# Patient Record
Sex: MALE | Race: WHITE | Employment: UNEMPLOYED | URBAN - METROPOLITAN AREA
[De-identification: names, ages, dates, MRNs, and addresses within clinical notes are randomized per-mention and may not be internally consistent; named-entity substitution may affect disease eponyms.]

---

## 2018-08-26 ENCOUNTER — HOSPITAL ENCOUNTER (EMERGENCY)
Age: 33
Discharge: HOME | End: 2018-08-26
Attending: EMERGENCY MEDICINE
Payer: COMMERCIAL

## 2018-08-26 ENCOUNTER — APPOINTMENT (OUTPATIENT)
Dept: GENERAL RADIOLOGY | Age: 33
End: 2018-08-26
Payer: COMMERCIAL

## 2018-08-26 VITALS
RESPIRATION RATE: 18 BRPM | SYSTOLIC BLOOD PRESSURE: 133 MMHG | HEIGHT: 70 IN | BODY MASS INDEX: 38.65 KG/M2 | WEIGHT: 270 LBS | OXYGEN SATURATION: 95 % | DIASTOLIC BLOOD PRESSURE: 66 MMHG | HEART RATE: 89 BPM | TEMPERATURE: 98.2 F

## 2018-08-26 DIAGNOSIS — S61.412D LACERATION OF LEFT HAND WITHOUT FOREIGN BODY, SUBSEQUENT ENCOUNTER: Primary | ICD-10-CM

## 2018-08-26 PROCEDURE — 73130 X-RAY EXAM OF HAND: CPT

## 2018-08-26 PROCEDURE — 90715 TDAP VACCINE 7 YRS/> IM: CPT | Performed by: EMERGENCY MEDICINE

## 2018-08-26 PROCEDURE — 99282 EMERGENCY DEPT VISIT SF MDM: CPT

## 2018-08-26 PROCEDURE — 29125 APPL SHORT ARM SPLINT STATIC: CPT

## 2018-08-26 PROCEDURE — 6370000000 HC RX 637 (ALT 250 FOR IP): Performed by: EMERGENCY MEDICINE

## 2018-08-26 PROCEDURE — 6360000002 HC RX W HCPCS: Performed by: EMERGENCY MEDICINE

## 2018-08-26 PROCEDURE — 90471 IMMUNIZATION ADMIN: CPT | Performed by: EMERGENCY MEDICINE

## 2018-08-26 RX ORDER — CEPHALEXIN 500 MG/1
500 CAPSULE ORAL 4 TIMES DAILY
Qty: 28 CAPSULE | Refills: 0 | Status: SHIPPED | OUTPATIENT
Start: 2018-08-26 | End: 2018-09-02

## 2018-08-26 RX ORDER — METFORMIN HYDROCHLORIDE 500 MG/1
500 TABLET, EXTENDED RELEASE ORAL 2 TIMES DAILY
COMMUNITY
End: 2019-04-26 | Stop reason: SDUPTHER

## 2018-08-26 RX ORDER — HYDROCODONE BITARTRATE AND ACETAMINOPHEN 5; 325 MG/1; MG/1
1 TABLET ORAL EVERY 6 HOURS PRN
Qty: 10 TABLET | Refills: 0 | Status: SHIPPED | OUTPATIENT
Start: 2018-08-26 | End: 2018-08-29

## 2018-08-26 RX ORDER — IBUPROFEN 600 MG/1
600 TABLET ORAL EVERY 6 HOURS PRN
Qty: 30 TABLET | Refills: 0 | Status: SHIPPED | OUTPATIENT
Start: 2018-08-26 | End: 2018-10-21 | Stop reason: ALTCHOICE

## 2018-08-26 RX ORDER — GINSENG 100 MG
CAPSULE ORAL 3 TIMES DAILY
Status: DISCONTINUED | OUTPATIENT
Start: 2018-08-26 | End: 2018-08-26 | Stop reason: HOSPADM

## 2018-08-26 RX ORDER — CEPHALEXIN 500 MG/1
500 CAPSULE ORAL ONCE
Status: COMPLETED | OUTPATIENT
Start: 2018-08-26 | End: 2018-08-26

## 2018-08-26 RX ORDER — GLIPIZIDE 5 MG/1
5 TABLET ORAL
COMMUNITY
End: 2019-04-08 | Stop reason: SDUPTHER

## 2018-08-26 RX ORDER — IBUPROFEN 400 MG/1
800 TABLET ORAL ONCE
Status: COMPLETED | OUTPATIENT
Start: 2018-08-26 | End: 2018-08-26

## 2018-08-26 RX ADMIN — IBUPROFEN 800 MG: 400 TABLET ORAL at 18:15

## 2018-08-26 RX ADMIN — TETANUS TOXOID, REDUCED DIPHTHERIA TOXOID AND ACELLULAR PERTUSSIS VACCINE, ADSORBED 0.5 ML: 5; 2.5; 8; 8; 2.5 SUSPENSION INTRAMUSCULAR at 18:14

## 2018-08-26 RX ADMIN — CEPHALEXIN 500 MG: 500 CAPSULE ORAL at 18:15

## 2018-08-26 ASSESSMENT — ENCOUNTER SYMPTOMS
CHOKING: 0
ABDOMINAL PAIN: 0
FACIAL SWELLING: 0
EYE REDNESS: 0
CHEST TIGHTNESS: 0
SHORTNESS OF BREATH: 0
BACK PAIN: 0
CONSTIPATION: 0
SINUS PRESSURE: 0
BLOOD IN STOOL: 0
SORE THROAT: 0
EYE DISCHARGE: 0
DIARRHEA: 0
WHEEZING: 0
VOMITING: 0
VOICE CHANGE: 0
STRIDOR: 0
TROUBLE SWALLOWING: 0
COUGH: 0
EYE PAIN: 0

## 2018-08-26 ASSESSMENT — PAIN SCALES - GENERAL
PAINLEVEL_OUTOF10: 8
PAINLEVEL_OUTOF10: 4
PAINLEVEL_OUTOF10: 8

## 2018-08-26 ASSESSMENT — PAIN DESCRIPTION - LOCATION
LOCATION: HAND
LOCATION: HAND

## 2018-08-26 ASSESSMENT — PAIN DESCRIPTION - ONSET: ONSET: SUDDEN

## 2018-08-26 ASSESSMENT — PAIN DESCRIPTION - PROGRESSION
CLINICAL_PROGRESSION: GRADUALLY IMPROVING
CLINICAL_PROGRESSION: NOT CHANGED

## 2018-08-26 ASSESSMENT — PAIN DESCRIPTION - PAIN TYPE
TYPE: ACUTE PAIN
TYPE: ACUTE PAIN

## 2018-08-26 ASSESSMENT — PAIN DESCRIPTION - DESCRIPTORS: DESCRIPTORS: ACHING

## 2018-08-26 ASSESSMENT — PAIN DESCRIPTION - ORIENTATION
ORIENTATION: LEFT
ORIENTATION: LEFT

## 2018-08-26 NOTE — ED PROVIDER NOTES
[facility] ED  eMERGENCY dEPARTMENT eNCOUnter      Pt Name: Rick Bassett  MRN: [id number]  [place] 1985  Date of evaluation: 8/26/2018  Provider: Raymundo Zavala MD    [address] [place] [address]       Chief Complaint   Patient presents with    Laceration     laceration to left hand from an axe while cutting wood, injured last night         HISTORY OF PRESENT ILLNESS   (Location/Symptom, Timing/Onset, Context/Setting, Quality, Duration, Modifying Factors, Severity)  Note limiting factors. Rick Bassett is a 26 y.o. male who presents to the emergency department Patient accidentally cut his left hand on an ax while he was cutting wood it happened last night as per patient he was busy taking care of his kids so he did not seek attention and he came at this time more than 18 hours after miss some clotted blood in the left hand wound which happened yesterday patient has no numbness tingling no other injuries last tetanus immunization not sure    HPI    Nursing Notes were reviewed. REVIEW OF SYSTEMS    (2-9 systems for level 4, 10 or more for level 5)     Review of Systems   Constitutional: Negative. Negative for activity change and fever. HENT: Negative for congestion, drooling, facial swelling, mouth sores, nosebleeds, sinus pressure, sore throat, trouble swallowing and voice change. Eyes: Negative for pain, discharge, redness and visual disturbance. Respiratory: Negative for cough, choking, chest tightness, shortness of breath, wheezing and stridor. Cardiovascular: Negative for chest pain, palpitations and leg swelling. Gastrointestinal: Negative for abdominal pain, blood in stool, constipation, diarrhea and vomiting. Endocrine: Negative for cold intolerance, polyphagia and polyuria. Genitourinary: Negative for dysuria, flank pain, frequency, genital sores and urgency. Musculoskeletal: Negative for back pain, joint swelling, neck pain and neck stiffness. Skin: Positive for wound.  Negative for pallor and rash. Neurological: Negative for tremors, seizures, syncope, weakness, numbness and headaches. Hematological: Negative for adenopathy. Does not bruise/bleed easily. Psychiatric/Behavioral: Negative for agitation, behavioral problems, hallucinations and sleep disturbance. The patient is not hyperactive. All other systems reviewed and are negative. Except as noted above the remainder of the review of systems was reviewed and negative. PAST MEDICAL HISTORY     Past Medical History:   Diagnosis Date    Diabetes mellitus ([facility] [address]     Hypertension          SURGICAL HISTORY       Past Surgical History:   Procedure Laterality Date    TONSILLECTOMY AND ADENOIDECTOMY           CURRENT MEDICATIONS       Previous Medications    GLIPIZIDE (GLUCOTROL) 5 MG TABLET    Take 5 mg by mouth 2 times daily (before meals)    METFORMIN (GLUCOPHAGE-XR) 500 MG EXTENDED RELEASE TABLET    Take 500 mg by mouth 2 times daily    METOPROLOL (TOPROL XL) 50 MG XL TABLET    Take 50 mg by mouth 2 times daily. ALLERGIES     Clindamycin/lincomycin and Pcn [penicillins]    FAMILY HISTORY     History reviewed. No pertinent family history.        SOCIAL HISTORY       Social History     Social History    Marital status: Single     Spouse name: N/A    Number of children: N/A    Years of education: N/A     Social History Main Topics    Smoking status: Current Every Day Smoker     Packs/day: 1.00     Types: Cigarettes    Smokeless tobacco: Never Used      Comment: trying to quit    Alcohol use No    Drug use: No    Sexual activity: Not Asked     Other Topics Concern    None     Social History Narrative    None       SCREENINGS             PHYSICAL EXAM    (up to 7 for level 4, 8 or more for level 5)     ED Triage Vitals [08/26/18 1759]   BP Temp Temp Source Pulse Resp SpO2 Height Weight   133/66 98.2 °F (36.8 °C) Oral 89 18 95 % 5' 10\" (1.778 m) 270 lb (122.5 kg)       Physical Exam   Constitutional: He is oriented to person, place, and time. He appears well-developed. Active alert cooperative patient   HENT:   Head: Normocephalic. Eyes: Conjunctivae and EOM are normal. Right eye exhibits no discharge. Left eye exhibits no discharge. No scleral icterus. Neck: Neck supple. No JVD present. No tracheal deviation present. No thyromegaly present. Cardiovascular: Normal rate and normal heart sounds. Exam reveals no gallop. No murmur heard. Pulmonary/Chest: Breath sounds normal. No respiratory distress. He has no wheezes. Abdominal: Soft. Bowel sounds are normal. He exhibits no mass. There is no rebound. Musculoskeletal: Normal range of motion. He exhibits edema and tenderness. Attention given to the left hand patient has a [address] by 0.2 cm laceration to the left hand dorsum of the left thenar eminence patient able to touch thumb to the little finger patient has good abduction and adduction clotted blood seen wound explored but there is no foreign body being ordered I was 18 hours cart Saturday not to stitch at this time, patient understood very well   Lymphadenopathy:     He has no cervical adenopathy. Neurological: He is alert and oriented to person, place, and time. No cranial nerve deficit. He exhibits normal muscle tone. Skin: Skin is warm. No rash noted. No erythema.    Psychiatric: His behavior is normal. Thought content normal.       DIAGNOSTIC RESULTS     EKG: All EKG's are interpreted by the Emergency Department Physician who either signs or Co-signs this chart in the absence of a cardiologist.        RADIOLOGY:   Non-plain film images such as CT, Ultrasound and MRI are read by the radiologist. Plain radiographic images are visualized and preliminarily interpreted by the emergency physician with the below findings:        Interpretation per the Radiologist below, if available at the time of this note:    XR HAND LEFT (MIN 3 VIEWS)    (Results Pending)         ED BEDSIDE ULTRASOUND:   Performed by ED

## 2018-08-26 NOTE — ED NOTES
Wound care and dressing applied to left hand wound, velcro thumb spica applied per order, Pt MSP intact, Pt bell well     Tanika Flaherty RN  08/26/18 1148

## 2018-09-19 ENCOUNTER — APPOINTMENT (OUTPATIENT)
Dept: GENERAL RADIOLOGY | Age: 33
End: 2018-09-19
Payer: COMMERCIAL

## 2018-09-19 ENCOUNTER — HOSPITAL ENCOUNTER (EMERGENCY)
Age: 33
Discharge: HOME | End: 2018-09-19
Attending: EMERGENCY MEDICINE
Payer: COMMERCIAL

## 2018-09-19 VITALS
HEIGHT: 70 IN | WEIGHT: 260 LBS | OXYGEN SATURATION: 98 % | SYSTOLIC BLOOD PRESSURE: 147 MMHG | BODY MASS INDEX: 37.22 KG/M2 | RESPIRATION RATE: 16 BRPM | HEART RATE: 67 BPM | DIASTOLIC BLOOD PRESSURE: 77 MMHG | TEMPERATURE: 98.5 F

## 2018-09-19 DIAGNOSIS — T14.8XXA CRUSH INJURY: ICD-10-CM

## 2018-09-19 DIAGNOSIS — S60.012A CONTUSION OF LEFT THUMB WITHOUT DAMAGE TO NAIL, INITIAL ENCOUNTER: ICD-10-CM

## 2018-09-19 DIAGNOSIS — M79.642 HAND PAIN, LEFT: Primary | ICD-10-CM

## 2018-09-19 PROCEDURE — 99283 EMERGENCY DEPT VISIT LOW MDM: CPT

## 2018-09-19 PROCEDURE — 73130 X-RAY EXAM OF HAND: CPT

## 2018-09-19 RX ORDER — OXYCODONE HYDROCHLORIDE AND ACETAMINOPHEN 5; 325 MG/1; MG/1
1 TABLET ORAL EVERY 6 HOURS PRN
Qty: 12 TABLET | Refills: 0 | Status: SHIPPED | OUTPATIENT
Start: 2018-09-19 | End: 2018-09-22

## 2018-09-19 ASSESSMENT — PAIN DESCRIPTION - ORIENTATION
ORIENTATION: LEFT
ORIENTATION: LEFT

## 2018-09-19 ASSESSMENT — ENCOUNTER SYMPTOMS
TROUBLE SWALLOWING: 0
DIARRHEA: 0
CONSTIPATION: 0
VOICE CHANGE: 0
EYE PAIN: 0
EYE DISCHARGE: 0
SORE THROAT: 0
EYE REDNESS: 0
FACIAL SWELLING: 0
WHEEZING: 0
CHEST TIGHTNESS: 0
SINUS PRESSURE: 0
STRIDOR: 0
COUGH: 0
BACK PAIN: 0
ABDOMINAL PAIN: 0
CHOKING: 0
VOMITING: 0
SHORTNESS OF BREATH: 0
BLOOD IN STOOL: 0

## 2018-09-19 ASSESSMENT — PAIN DESCRIPTION - DESCRIPTORS: DESCRIPTORS: SHARP;THROBBING

## 2018-09-19 ASSESSMENT — PAIN DESCRIPTION - LOCATION
LOCATION: HAND
LOCATION: HAND

## 2018-09-19 ASSESSMENT — PAIN DESCRIPTION - PAIN TYPE: TYPE: ACUTE PAIN

## 2018-09-19 ASSESSMENT — PAIN SCALES - GENERAL
PAINLEVEL_OUTOF10: 8
PAINLEVEL_OUTOF10: 7

## 2018-09-19 NOTE — ED TRIAGE NOTES
Patient arrives with left hand pain. States approx one month ago he injured it and was seen at that time. Today states he re injured by closing in door. Throbbing pain with swelling, tenderness and painful movement. A/0 x3, ambulatory, resps even and unlabored on room air. Call bell in reach.

## 2018-09-20 NOTE — ED PROVIDER NOTES
[facility] ED  eMERGENCY dEPARTMENT eNCOUnter      Pt Name: Nafisa Soto  MRN: [id number]  [place] 1985  Date of evaluation: 9/19/2018  Provider: John Araujo MD    [address] [place] [address]       Chief Complaint   Patient presents with    Hand Pain     Left         HISTORY OF PRESENT ILLNESS   (Location/Symptom, Timing/Onset, Context/Setting, Quality, Duration, Modifying Factors, Severity)  Note limiting factors. Nafisa Soto is a 26 y.o. male who presents to the emergency department Patient with history of hypertension ADHD known to me from last visit when she he had a cut to the right hand and came 1 day after injury and which is in the process of healing at this time finish antibiotics patient only injured his same place a right hand below the thumb with a crush injury to the car door as per patient no new laceration but soft tissue swelling and pain     HPI    Nursing Notes were reviewed. REVIEW OF SYSTEMS    (2-9 systems for level 4, 10 or more for level 5)     Review of Systems   Constitutional: Negative. Negative for activity change and fever. HENT: Negative for congestion, drooling, facial swelling, mouth sores, nosebleeds, sinus pressure, sore throat, trouble swallowing and voice change. Eyes: Negative for pain, discharge, redness and visual disturbance. Respiratory: Negative for cough, choking, chest tightness, shortness of breath, wheezing and stridor. Cardiovascular: Negative for chest pain, palpitations and leg swelling. Gastrointestinal: Negative for abdominal pain, blood in stool, constipation, diarrhea and vomiting. Endocrine: Negative for cold intolerance, polyphagia and polyuria. Genitourinary: Negative for dysuria, flank pain, frequency, genital sores and urgency. Musculoskeletal: Positive for arthralgias and joint swelling. Negative for back pain, neck pain and neck stiffness. Skin: Negative for pallor and rash.    Neurological: Negative for tremors, seizures, syncope, weakness, numbness and headaches. Hematological: Negative for adenopathy. Does not bruise/bleed easily. Psychiatric/Behavioral: Negative for agitation, behavioral problems, hallucinations and sleep disturbance. The patient is not hyperactive. All other systems reviewed and are negative. Except as noted above the remainder of the review of systems was reviewed and negative. PAST MEDICAL HISTORY     Past Medical History:   Diagnosis Date    Diabetes mellitus ([facility] [address]     Hypertension          SURGICAL HISTORY       Past Surgical History:   Procedure Laterality Date    HERNIA REPAIR      MOUTH SURGERY      TONSILLECTOMY AND ADENOIDECTOMY           CURRENT MEDICATIONS       Discharge Medication List as of 9/19/2018  7:03 PM      CONTINUE these medications which have NOT CHANGED    Details   glipiZIDE (GLUCOTROL) 5 MG tablet Take 5 mg by mouth 2 times daily (before meals)Historical Med      metFORMIN (GLUCOPHAGE-XR) 500 MG extended release tablet Take 500 mg by mouth 2 times dailyHistorical Med      ibuprofen (ADVIL;MOTRIN) 600 MG tablet Take 1 tablet by mouth every 6 hours as needed for Pain, Disp-30 tablet, R-0Print      metoprolol (TOPROL XL) 50 MG XL tablet Take 50 mg by mouth 2 times daily. ALLERGIES     Clindamycin/lincomycin; Pcn [penicillins]; and Tramadol    FAMILY HISTORY     History reviewed. No pertinent family history.        SOCIAL HISTORY       Social History     Social History    Marital status: Single     Spouse name: N/A    Number of children: N/A    Years of education: N/A     Social History Main Topics    Smoking status: Current Every Day Smoker     Packs/day: 1.00     Types: Cigarettes    Smokeless tobacco: Never Used      Comment: trying to quit    Alcohol use No    Drug use: No    Sexual activity: Not Asked     Other Topics Concern    None     Social History Narrative    None       SCREENINGS             PHYSICAL EXAM    (up to 7 for level 4, 8 or

## 2018-09-29 ENCOUNTER — HOSPITAL ENCOUNTER (EMERGENCY)
Age: 33
Discharge: HOME | End: 2018-09-29
Attending: EMERGENCY MEDICINE
Payer: COMMERCIAL

## 2018-09-29 VITALS
HEART RATE: 79 BPM | HEIGHT: 70 IN | TEMPERATURE: 98.2 F | OXYGEN SATURATION: 99 % | DIASTOLIC BLOOD PRESSURE: 84 MMHG | RESPIRATION RATE: 18 BRPM | WEIGHT: 259 LBS | SYSTOLIC BLOOD PRESSURE: 162 MMHG | BODY MASS INDEX: 37.08 KG/M2

## 2018-09-29 DIAGNOSIS — M79.642 LEFT HAND PAIN: Primary | ICD-10-CM

## 2018-09-29 DIAGNOSIS — S60.222D CONTUSION OF LEFT HAND, SUBSEQUENT ENCOUNTER: ICD-10-CM

## 2018-09-29 PROCEDURE — 96372 THER/PROPH/DIAG INJ SC/IM: CPT

## 2018-09-29 PROCEDURE — 6360000002 HC RX W HCPCS: Performed by: EMERGENCY MEDICINE

## 2018-09-29 PROCEDURE — 29125 APPL SHORT ARM SPLINT STATIC: CPT

## 2018-09-29 PROCEDURE — 99283 EMERGENCY DEPT VISIT LOW MDM: CPT

## 2018-09-29 RX ORDER — KETOROLAC TROMETHAMINE 30 MG/ML
60 INJECTION, SOLUTION INTRAMUSCULAR; INTRAVENOUS ONCE
Status: COMPLETED | OUTPATIENT
Start: 2018-09-29 | End: 2018-09-29

## 2018-09-29 RX ORDER — KETOROLAC TROMETHAMINE 10 MG/1
10 TABLET, FILM COATED ORAL EVERY 6 HOURS PRN
Qty: 20 TABLET | Refills: 0 | Status: SHIPPED | OUTPATIENT
Start: 2018-09-29 | End: 2018-11-17

## 2018-09-29 RX ORDER — OXYCODONE HYDROCHLORIDE AND ACETAMINOPHEN 5; 325 MG/1; MG/1
1 TABLET ORAL EVERY 6 HOURS PRN
Qty: 10 TABLET | Refills: 0 | Status: SHIPPED | OUTPATIENT
Start: 2018-09-29 | End: 2018-10-06

## 2018-09-29 RX ORDER — ORPHENADRINE CITRATE 30 MG/ML
60 INJECTION INTRAMUSCULAR; INTRAVENOUS ONCE
Status: COMPLETED | OUTPATIENT
Start: 2018-09-29 | End: 2018-09-29

## 2018-09-29 RX ADMIN — ORPHENADRINE CITRATE 60 MG: 30 INJECTION INTRAMUSCULAR; INTRAVENOUS at 18:34

## 2018-09-29 RX ADMIN — KETOROLAC TROMETHAMINE 60 MG: 30 INJECTION, SOLUTION INTRAMUSCULAR at 18:33

## 2018-09-29 ASSESSMENT — PAIN DESCRIPTION - LOCATION: LOCATION: HAND

## 2018-09-29 ASSESSMENT — ENCOUNTER SYMPTOMS
ABDOMINAL PAIN: 0
ABDOMINAL DISTENTION: 0
DIARRHEA: 0
SINUS PRESSURE: 0
COLOR CHANGE: 0
CONSTIPATION: 0
EYE PAIN: 0
WHEEZING: 0
COUGH: 0
VOMITING: 0
APNEA: 0
NAUSEA: 0
RHINORRHEA: 0
SHORTNESS OF BREATH: 0
SORE THROAT: 0
BACK PAIN: 0
PHOTOPHOBIA: 0

## 2018-09-29 ASSESSMENT — PAIN SCALES - GENERAL: PAINLEVEL_OUTOF10: 5

## 2018-09-29 ASSESSMENT — PAIN DESCRIPTION - ORIENTATION: ORIENTATION: LEFT

## 2018-09-29 NOTE — ED PROVIDER NOTES
for dizziness, tremors, syncope, weakness, light-headedness and headaches. Psychiatric/Behavioral: Negative for agitation, confusion and hallucinations. All other systems reviewed and are negative. Except as noted above the remainder of the review of systems was reviewed and negative. PAST MEDICAL HISTORY     Past Medical History:   Diagnosis Date    Diabetes mellitus ([facility] [address]     Hypertension          SURGICAL HISTORY       Past Surgical History:   Procedure Laterality Date    HERNIA REPAIR      MOUTH SURGERY      TONSILLECTOMY AND ADENOIDECTOMY           CURRENT MEDICATIONS       Discharge Medication List as of 9/29/2018  6:39 PM      CONTINUE these medications which have NOT CHANGED    Details   glipiZIDE (GLUCOTROL) 5 MG tablet Take 5 mg by mouth 2 times daily (before meals)Historical Med      metFORMIN (GLUCOPHAGE-XR) 500 MG extended release tablet Take 500 mg by mouth 2 times dailyHistorical Med      metoprolol (TOPROL XL) 50 MG XL tablet Take 50 mg by mouth 2 times daily. ibuprofen (ADVIL;MOTRIN) 600 MG tablet Take 1 tablet by mouth every 6 hours as needed for Pain, Disp-30 tablet, R-0Print             ALLERGIES     Clindamycin/lincomycin; Pcn [penicillins]; and Tramadol    FAMILY HISTORY     History reviewed. No pertinent family history.        SOCIAL HISTORY       Social History     Social History    Marital status: Single     Spouse name: N/A    Number of children: N/A    Years of education: N/A     Social History Main Topics    Smoking status: Current Every Day Smoker     Packs/day: 1.00     Types: Cigarettes    Smokeless tobacco: Never Used      Comment: trying to quit    Alcohol use No    Drug use: No    Sexual activity: Not Asked     Other Topics Concern    None     Social History Narrative    None       SCREENINGS             PHYSICAL EXAM    (up to 7 for level 4, 8 or more for level 5)     ED Triage Vitals   BP Temp Temp Source Pulse Resp SpO2 Height Weight   09/29/18

## 2018-10-13 ENCOUNTER — HOSPITAL ENCOUNTER (EMERGENCY)
Age: 33
Discharge: HOME | End: 2018-10-13
Attending: EMERGENCY MEDICINE
Payer: COMMERCIAL

## 2018-10-13 VITALS
DIASTOLIC BLOOD PRESSURE: 79 MMHG | TEMPERATURE: 98.4 F | WEIGHT: 259 LBS | SYSTOLIC BLOOD PRESSURE: 145 MMHG | OXYGEN SATURATION: 98 % | BODY MASS INDEX: 37.08 KG/M2 | HEIGHT: 70 IN | HEART RATE: 76 BPM | RESPIRATION RATE: 18 BRPM

## 2018-10-13 DIAGNOSIS — S69.91XS THUMB INJURY, RIGHT, SEQUELA: ICD-10-CM

## 2018-10-13 DIAGNOSIS — M79.641 RIGHT HAND PAIN: Primary | ICD-10-CM

## 2018-10-13 PROCEDURE — 99283 EMERGENCY DEPT VISIT LOW MDM: CPT

## 2018-10-13 RX ORDER — HYDROCODONE BITARTRATE AND ACETAMINOPHEN 5; 325 MG/1; MG/1
1 TABLET ORAL EVERY 6 HOURS PRN
Qty: 12 TABLET | Refills: 0 | Status: SHIPPED | OUTPATIENT
Start: 2018-10-13 | End: 2018-10-18

## 2018-10-13 ASSESSMENT — ENCOUNTER SYMPTOMS
FACIAL SWELLING: 0
SINUS PRESSURE: 0
SHORTNESS OF BREATH: 0
ABDOMINAL PAIN: 0
VOMITING: 0
DIARRHEA: 0
CHEST TIGHTNESS: 0
STRIDOR: 0
EYE REDNESS: 0
COUGH: 0
BLOOD IN STOOL: 0
TROUBLE SWALLOWING: 0
VOICE CHANGE: 0
CONSTIPATION: 0
CHOKING: 0
SORE THROAT: 0
WHEEZING: 0
EYE DISCHARGE: 0
BACK PAIN: 0
EYE PAIN: 0

## 2018-10-13 ASSESSMENT — PAIN DESCRIPTION - LOCATION: LOCATION: HAND

## 2018-10-13 ASSESSMENT — PAIN SCALES - GENERAL: PAINLEVEL_OUTOF10: 7

## 2018-10-13 ASSESSMENT — PAIN DESCRIPTION - ORIENTATION: ORIENTATION: LEFT

## 2018-10-13 ASSESSMENT — PAIN DESCRIPTION - PAIN TYPE: TYPE: ACUTE PAIN

## 2018-10-13 ASSESSMENT — PAIN DESCRIPTION - DESCRIPTORS: DESCRIPTORS: ACHING

## 2018-10-13 NOTE — ED PROVIDER NOTES
neck pain and neck stiffness. Skin: Negative for pallor and rash. Neurological: Negative for tremors, seizures, syncope, weakness, numbness and headaches. Hematological: Negative for adenopathy. Does not bruise/bleed easily. Psychiatric/Behavioral: Negative for agitation, behavioral problems, hallucinations and sleep disturbance. The patient is not hyperactive. All other systems reviewed and are negative. Except as noted above the remainder of the review of systems was reviewed and negative. PAST MEDICAL HISTORY     Past Medical History:   Diagnosis Date    Diabetes mellitus ([facility] [address]     Hypertension          SURGICALHISTORY       Past Surgical History:   Procedure Laterality Date    HERNIA REPAIR      MOUTH SURGERY      TONSILLECTOMY AND ADENOIDECTOMY           CURRENT MEDICATIONS       Discharge Medication List as of 10/13/2018  4:51 PM      CONTINUE these medications which have NOT CHANGED    Details   ketorolac (TORADOL) 10 MG tablet Take 1 tablet by mouth every 6 hours as needed for Pain, Disp-20 tablet, R-0Print      glipiZIDE (GLUCOTROL) 5 MG tablet Take 5 mg by mouth 2 times daily (before meals)Historical Med      metFORMIN (GLUCOPHAGE-XR) 500 MG extended release tablet Take 500 mg by mouth 2 times dailyHistorical Med      ibuprofen (ADVIL;MOTRIN) 600 MG tablet Take 1 tablet by mouth every 6 hours as needed for Pain, Disp-30 tablet, R-0Print      metoprolol (TOPROL XL) 50 MG XL tablet Take 50 mg by mouth 2 times daily. ALLERGIES     Clindamycin/lincomycin; Pcn [penicillins]; and Tramadol    FAMILY HISTORY     History reviewed. No pertinent family history.        SOCIAL HISTORY       Social History     Social History    Marital status: Single     Spouse name: N/A    Number of children: N/A    Years of education: N/A     Social History Main Topics    Smoking status: Current Every Day Smoker     Packs/day: 1.00     Types: Cigarettes    Smokeless tobacco: Never Used

## 2018-10-21 ENCOUNTER — HOSPITAL ENCOUNTER (EMERGENCY)
Age: 33
Discharge: HOME | End: 2018-10-21
Attending: EMERGENCY MEDICINE
Payer: COMMERCIAL

## 2018-10-21 VITALS
DIASTOLIC BLOOD PRESSURE: 99 MMHG | OXYGEN SATURATION: 97 % | HEART RATE: 61 BPM | RESPIRATION RATE: 16 BRPM | TEMPERATURE: 98.3 F | SYSTOLIC BLOOD PRESSURE: 164 MMHG | WEIGHT: 260 LBS | BODY MASS INDEX: 37.22 KG/M2 | HEIGHT: 70 IN

## 2018-10-21 DIAGNOSIS — G89.29 CHRONIC HAND PAIN, LEFT: Primary | ICD-10-CM

## 2018-10-21 DIAGNOSIS — M79.642 CHRONIC HAND PAIN, LEFT: Primary | ICD-10-CM

## 2018-10-21 PROCEDURE — 99283 EMERGENCY DEPT VISIT LOW MDM: CPT

## 2018-10-21 RX ORDER — IBUPROFEN 800 MG/1
800 TABLET ORAL EVERY 8 HOURS PRN
Qty: 15 TABLET | Refills: 0 | Status: SHIPPED | OUTPATIENT
Start: 2018-10-21 | End: 2018-11-17

## 2018-10-21 ASSESSMENT — PAIN DESCRIPTION - LOCATION
LOCATION: HAND
LOCATION: HAND

## 2018-10-21 ASSESSMENT — PAIN DESCRIPTION - FREQUENCY: FREQUENCY: CONTINUOUS

## 2018-10-21 ASSESSMENT — PAIN DESCRIPTION - DESCRIPTORS: DESCRIPTORS: THROBBING

## 2018-10-21 ASSESSMENT — PAIN DESCRIPTION - PROGRESSION
CLINICAL_PROGRESSION: NOT CHANGED
CLINICAL_PROGRESSION: NOT CHANGED

## 2018-10-21 ASSESSMENT — PAIN DESCRIPTION - PAIN TYPE
TYPE: CHRONIC PAIN
TYPE: CHRONIC PAIN

## 2018-10-21 ASSESSMENT — PAIN DESCRIPTION - ORIENTATION
ORIENTATION: LEFT
ORIENTATION: LEFT

## 2018-10-21 ASSESSMENT — PAIN SCALES - GENERAL
PAINLEVEL_OUTOF10: 7
PAINLEVEL_OUTOF10: 7

## 2018-10-21 NOTE — ED PROVIDER NOTES
MG XL TABLET    Take 50 mg by mouth 2 times daily. ALLERGIES     Clindamycin/lincomycin; Pcn [penicillins]; and Tramadol    FAMILY HISTORY     History reviewed. No pertinent family history. SOCIAL HISTORY       Social History     Social History    Marital status: Single     Spouse name: N/A    Number of children: N/A    Years of education: N/A     Social History Main Topics    Smoking status: Current Every Day Smoker     Packs/day: 0.50     Types: Cigarettes    Smokeless tobacco: Never Used      Comment: trying to quit    Alcohol use No    Drug use: No    Sexual activity: Not Asked     Other Topics Concern    None     Social History Narrative    None           PHYSICAL EXAM    (up to 7 for level 4, 8 or more for level 5)     ED Triage Vitals [10/21/18 1612]   BP Temp Temp Source Pulse Resp SpO2 Height Weight   (!) 164/99 98.3 °F (36.8 °C) Oral 61 16 97 % 5' 10\" (1.778 m) 260 lb (117.9 kg)       Physical Exam    1. Const: Well-nourished, Well-developed  2. Eyes: PERRL, no conjunctival injection, and symmetrical lids. 3. Cardiovascular: +S1/S2, No murmurs or gallops . Peripheral pulses 2+ and equal in all extremities. 4. Lungs: Unlabored respiratory effort. Clear to auscultation bilaterally  5. Musculoskeletal:  Extremities without deformity. . No cyanosis or clubbing. Well appearing scar left hand at the base of the thumb, no redness, erythema, swelling or fluctuance noted  6. Skin: Warm, Dry. No rashes or lesions . 7. Neuro: CNs II-XII grossly intact . Sensation grossly intact . 8. Psych: Awake, Alert, & Oriented.  Appropriate mood and affect    DIAGNOSTIC RESULTS           No orders to display           [address] [place] [address] and DIFFERENTIAL DIAGNOSIS/MDM:   Vitals:    Vitals:    10/21/18 1612   BP: (!) 164/99   Pulse: 61   Resp: 16   Temp: 98.3 °F (36.8 °C)   TempSrc: Oral   SpO2: 97%   Weight: 260 lb (117.9 kg)   Height: 5' 10\" (1.778 m)       I did explain to the patient that

## 2018-11-17 ENCOUNTER — APPOINTMENT (OUTPATIENT)
Dept: GENERAL RADIOLOGY | Age: 33
End: 2018-11-17
Payer: COMMERCIAL

## 2018-11-17 ENCOUNTER — HOSPITAL ENCOUNTER (EMERGENCY)
Age: 33
Discharge: HOME | End: 2018-11-17
Attending: EMERGENCY MEDICINE
Payer: COMMERCIAL

## 2018-11-17 VITALS
BODY MASS INDEX: 37.08 KG/M2 | OXYGEN SATURATION: 97 % | DIASTOLIC BLOOD PRESSURE: 98 MMHG | HEART RATE: 107 BPM | SYSTOLIC BLOOD PRESSURE: 162 MMHG | TEMPERATURE: 98 F | RESPIRATION RATE: 16 BRPM | WEIGHT: 259 LBS | HEIGHT: 70 IN

## 2018-11-17 DIAGNOSIS — S50.00XA CONTUSION OF ELBOW, UNSPECIFIED LATERALITY, INITIAL ENCOUNTER: ICD-10-CM

## 2018-11-17 DIAGNOSIS — S43.401A SPRAIN OF RIGHT SHOULDER, UNSPECIFIED SHOULDER SPRAIN TYPE, INITIAL ENCOUNTER: ICD-10-CM

## 2018-11-17 DIAGNOSIS — S49.90XA SHOULDER INJURY, INITIAL ENCOUNTER: Primary | ICD-10-CM

## 2018-11-17 PROCEDURE — 73030 X-RAY EXAM OF SHOULDER: CPT

## 2018-11-17 PROCEDURE — 73080 X-RAY EXAM OF ELBOW: CPT

## 2018-11-17 PROCEDURE — 99283 EMERGENCY DEPT VISIT LOW MDM: CPT

## 2018-11-17 RX ORDER — ACETAMINOPHEN 500 MG
1000 TABLET ORAL EVERY 6 HOURS PRN
COMMUNITY
End: 2019-05-09

## 2018-11-17 RX ORDER — NAPROXEN 500 MG/1
500 TABLET ORAL 2 TIMES DAILY
Qty: 20 TABLET | Refills: 0 | Status: SHIPPED | OUTPATIENT
Start: 2018-11-17 | End: 2019-03-27 | Stop reason: ALTCHOICE

## 2018-11-17 RX ORDER — IBUPROFEN 200 MG
400 TABLET ORAL EVERY 6 HOURS PRN
COMMUNITY
End: 2019-03-21

## 2018-11-17 RX ORDER — HYDROCODONE BITARTRATE AND ACETAMINOPHEN 5; 325 MG/1; MG/1
1 TABLET ORAL EVERY 6 HOURS PRN
Qty: 12 TABLET | Refills: 0 | Status: SHIPPED | OUTPATIENT
Start: 2018-11-17 | End: 2018-11-17

## 2018-11-17 ASSESSMENT — ENCOUNTER SYMPTOMS
ABDOMINAL PAIN: 0
FACIAL SWELLING: 0
WHEEZING: 0
SORE THROAT: 0
EYE DISCHARGE: 0
CHOKING: 0
CHEST TIGHTNESS: 0
VOMITING: 0
EYE REDNESS: 0
BACK PAIN: 0
VOICE CHANGE: 0
COUGH: 0
SHORTNESS OF BREATH: 0
BLOOD IN STOOL: 0
EYE PAIN: 0
TROUBLE SWALLOWING: 0
STRIDOR: 0
SINUS PRESSURE: 0
CONSTIPATION: 0
DIARRHEA: 0

## 2018-11-17 ASSESSMENT — PAIN DESCRIPTION - ONSET
ONSET: GRADUAL
ONSET: ON-GOING

## 2018-11-17 ASSESSMENT — PAIN DESCRIPTION - ORIENTATION
ORIENTATION: RIGHT
ORIENTATION: RIGHT

## 2018-11-17 ASSESSMENT — PAIN DESCRIPTION - LOCATION
LOCATION: ELBOW
LOCATION: ELBOW

## 2018-11-17 ASSESSMENT — PAIN DESCRIPTION - FREQUENCY
FREQUENCY: INTERMITTENT
FREQUENCY: CONTINUOUS

## 2018-11-17 ASSESSMENT — PAIN - FUNCTIONAL ASSESSMENT: PAIN_FUNCTIONAL_ASSESSMENT: 0-10

## 2018-11-17 ASSESSMENT — PAIN DESCRIPTION - PROGRESSION: CLINICAL_PROGRESSION: NOT CHANGED

## 2018-11-17 ASSESSMENT — PAIN DESCRIPTION - PAIN TYPE
TYPE: ACUTE PAIN
TYPE: ACUTE PAIN

## 2018-11-17 ASSESSMENT — PAIN DESCRIPTION - DESCRIPTORS
DESCRIPTORS: STABBING;NUMBNESS
DESCRIPTORS: SHARP

## 2018-11-17 ASSESSMENT — PAIN DESCRIPTION - DIRECTION: RADIATING_TOWARDS: RIGHT SHOULDER

## 2018-11-17 ASSESSMENT — PAIN SCALES - GENERAL
PAINLEVEL_OUTOF10: 4
PAINLEVEL_OUTOF10: 7

## 2018-11-17 NOTE — ED PROVIDER NOTES
TYLENOL 1 GM PO GIVEN MRI are read by the radiologist. Plain radiographic images are visualized and preliminarily interpreted by the emergency physician with the below findings:        Interpretation per the Radiologist below, if available at the time ofthis note:    XR ELBOW RIGHT (MIN 3 VIEWS)    (Results Pending)   XR SHOULDER RIGHT (MIN 2 VIEWS)    (Results Pending)         ED BEDSIDE ULTRASOUND:   Performed by ED Physician - none    LABS:  Labs Reviewed - No data to display    All other labs were within normal range or not returned as of this dictation. EMERGENCY DEPARTMENT COURSE and DIFFERENTIAL DIAGNOSIS/MDM:   Vitals:    Vitals:    11/17/18 1740   BP: (!) 162/98   Pulse: 107   Resp: 16   Temp: 98 °F (36.7 °C)   TempSrc: Oral   SpO2: 97%   Weight: 259 lb (117.5 kg)   Height: 5' 10\" (1.778 m)           MDM    CRITICAL CARE TIME   Total Critical Care time was  minutes, excluding separately reportableprocedures. There was a high probability of clinicallysignificant/life threatening deterioration in the patient's condition which required my urgent intervention. ONSULTS:  None    PROCEDURES:  Unless otherwise noted below, none     Procedures    FINAL IMPRESSION      1. Shoulder injury, initial encounter    2. Contusion of elbow, unspecified laterality, initial encounter    3. Sprain of right shoulder, unspecified shoulder sprain type, initial encounter          DISPOSITION/PLAN   DISPOSITION        PATIENT REFERRED TO:  Gracia Valerio MD  [address] [phone]      As needed      DISCHARGE MEDICATIONS:  New Prescriptions    HYDROCODONE-ACETAMINOPHEN (NORCO) 5-325 MG PER TABLET    Take 1 tablet by mouth every 6 hours as needed for Pain for up to 4 days. .          (Please note that portions of this note were completed with a voice recognition program.  Efforts were made to edit the dictations but occasionally words are mis-transcribed.)    Thompson Adams MD (electronically signed)  Attending

## 2019-01-15 ENCOUNTER — HOSPITAL ENCOUNTER (EMERGENCY)
Age: 34
Discharge: HOME | End: 2019-01-15
Attending: EMERGENCY MEDICINE
Payer: COMMERCIAL

## 2019-01-15 VITALS
OXYGEN SATURATION: 97 % | SYSTOLIC BLOOD PRESSURE: 167 MMHG | DIASTOLIC BLOOD PRESSURE: 92 MMHG | HEART RATE: 73 BPM | RESPIRATION RATE: 18 BRPM | TEMPERATURE: 97.8 F | BODY MASS INDEX: 39.37 KG/M2 | HEIGHT: 70 IN | WEIGHT: 275 LBS

## 2019-01-15 DIAGNOSIS — M27.3 DRY TOOTH SOCKET: Primary | ICD-10-CM

## 2019-01-15 PROCEDURE — 6360000002 HC RX W HCPCS: Performed by: EMERGENCY MEDICINE

## 2019-01-15 PROCEDURE — 99282 EMERGENCY DEPT VISIT SF MDM: CPT

## 2019-01-15 PROCEDURE — 6370000000 HC RX 637 (ALT 250 FOR IP): Performed by: EMERGENCY MEDICINE

## 2019-01-15 RX ORDER — OXYCODONE HYDROCHLORIDE AND ACETAMINOPHEN 5; 325 MG/1; MG/1
2 TABLET ORAL ONCE
Status: COMPLETED | OUTPATIENT
Start: 2019-01-15 | End: 2019-01-15

## 2019-01-15 RX ORDER — OXYCODONE HYDROCHLORIDE AND ACETAMINOPHEN 5; 325 MG/1; MG/1
1-2 TABLET ORAL EVERY 6 HOURS PRN
Qty: 16 TABLET | Refills: 0 | Status: SHIPPED | OUTPATIENT
Start: 2019-01-15 | End: 2019-01-18

## 2019-01-15 RX ORDER — PENICILLIN V POTASSIUM 500 MG/1
500 TABLET ORAL 4 TIMES DAILY
Qty: 28 TABLET | Refills: 0 | Status: SHIPPED | OUTPATIENT
Start: 2019-01-15 | End: 2019-01-22

## 2019-01-15 RX ORDER — ONDANSETRON 4 MG/1
4 TABLET, ORALLY DISINTEGRATING ORAL ONCE
Status: COMPLETED | OUTPATIENT
Start: 2019-01-15 | End: 2019-01-15

## 2019-01-15 RX ADMIN — OXYCODONE HYDROCHLORIDE AND ACETAMINOPHEN 2 TABLET: 5; 325 TABLET ORAL at 16:59

## 2019-01-15 RX ADMIN — ONDANSETRON 4 MG: 4 TABLET, ORALLY DISINTEGRATING ORAL at 16:59

## 2019-01-15 ASSESSMENT — PAIN DESCRIPTION - LOCATION: LOCATION: MOUTH

## 2019-01-15 ASSESSMENT — ENCOUNTER SYMPTOMS
EYES NEGATIVE: 1
SHORTNESS OF BREATH: 0
SINUS PRESSURE: 1
ALLERGIC/IMMUNOLOGIC NEGATIVE: 1
SORE THROAT: 0
SINUS PAIN: 1
VOMITING: 0
RHINORRHEA: 0
TROUBLE SWALLOWING: 0
NAUSEA: 0
ABDOMINAL PAIN: 0
WHEEZING: 0

## 2019-01-15 ASSESSMENT — PAIN SCALES - GENERAL
PAINLEVEL_OUTOF10: 8
PAINLEVEL_OUTOF10: 8

## 2019-01-15 ASSESSMENT — PAIN DESCRIPTION - DESCRIPTORS: DESCRIPTORS: ACHING

## 2019-01-19 ENCOUNTER — HOSPITAL ENCOUNTER (EMERGENCY)
Age: 34
Discharge: HOME | End: 2019-01-19
Attending: EMERGENCY MEDICINE
Payer: COMMERCIAL

## 2019-01-19 VITALS
WEIGHT: 275 LBS | BODY MASS INDEX: 39.37 KG/M2 | SYSTOLIC BLOOD PRESSURE: 159 MMHG | HEART RATE: 67 BPM | TEMPERATURE: 98.9 F | DIASTOLIC BLOOD PRESSURE: 84 MMHG | HEIGHT: 70 IN | OXYGEN SATURATION: 98 % | RESPIRATION RATE: 16 BRPM

## 2019-01-19 DIAGNOSIS — K08.89 PAIN, DENTAL: Primary | ICD-10-CM

## 2019-01-19 DIAGNOSIS — K04.7 DENTAL ABSCESS: ICD-10-CM

## 2019-01-19 PROCEDURE — 6370000000 HC RX 637 (ALT 250 FOR IP): Performed by: EMERGENCY MEDICINE

## 2019-01-19 PROCEDURE — 99282 EMERGENCY DEPT VISIT SF MDM: CPT

## 2019-01-19 RX ORDER — IBUPROFEN 400 MG/1
800 TABLET ORAL ONCE
Status: COMPLETED | OUTPATIENT
Start: 2019-01-19 | End: 2019-01-19

## 2019-01-19 RX ORDER — HYDROCODONE BITARTRATE AND ACETAMINOPHEN 5; 325 MG/1; MG/1
1 TABLET ORAL EVERY 6 HOURS PRN
Qty: 10 TABLET | Refills: 0 | Status: SHIPPED | OUTPATIENT
Start: 2019-01-19 | End: 2019-01-22

## 2019-01-19 RX ORDER — CEPHALEXIN 500 MG/1
500 CAPSULE ORAL ONCE
Status: COMPLETED | OUTPATIENT
Start: 2019-01-19 | End: 2019-01-19

## 2019-01-19 RX ORDER — HYDROCODONE BITARTRATE AND ACETAMINOPHEN 5; 325 MG/1; MG/1
2 TABLET ORAL ONCE
Status: COMPLETED | OUTPATIENT
Start: 2019-01-19 | End: 2019-01-19

## 2019-01-19 RX ORDER — ETODOLAC 400 MG/1
TABLET, FILM COATED ORAL
Qty: 25 TABLET | Refills: 0 | Status: SHIPPED | OUTPATIENT
Start: 2019-01-19 | End: 2019-03-21

## 2019-01-19 RX ORDER — CEPHALEXIN 500 MG/1
500 CAPSULE ORAL 4 TIMES DAILY
Qty: 28 CAPSULE | Refills: 0 | Status: SHIPPED | OUTPATIENT
Start: 2019-01-19 | End: 2019-01-26

## 2019-01-19 RX ADMIN — CEPHALEXIN 500 MG: 500 CAPSULE ORAL at 15:28

## 2019-01-19 RX ADMIN — IBUPROFEN 800 MG: 400 TABLET, FILM COATED ORAL at 15:28

## 2019-01-19 RX ADMIN — HYDROCODONE BITARTRATE AND ACETAMINOPHEN 2 TABLET: 5; 325 TABLET ORAL at 15:28

## 2019-01-19 ASSESSMENT — ENCOUNTER SYMPTOMS
NAUSEA: 0
CONSTIPATION: 0
VOMITING: 0
DIARRHEA: 0
SHORTNESS OF BREATH: 0
EYE PAIN: 0
ABDOMINAL PAIN: 0
PHOTOPHOBIA: 0
EYE DISCHARGE: 0
SORE THROAT: 0
COUGH: 0
WHEEZING: 0
EYE REDNESS: 0
STRIDOR: 0
BLOOD IN STOOL: 0
BACK PAIN: 0

## 2019-01-19 ASSESSMENT — PAIN DESCRIPTION - ORIENTATION: ORIENTATION: RIGHT;UPPER

## 2019-01-19 ASSESSMENT — PAIN SCALES - GENERAL
PAINLEVEL_OUTOF10: 9
PAINLEVEL_OUTOF10: 9

## 2019-02-02 ENCOUNTER — HOSPITAL ENCOUNTER (EMERGENCY)
Age: 34
Discharge: HOME | End: 2019-02-02
Attending: EMERGENCY MEDICINE
Payer: COMMERCIAL

## 2019-02-02 VITALS
HEIGHT: 70 IN | DIASTOLIC BLOOD PRESSURE: 88 MMHG | RESPIRATION RATE: 18 BRPM | HEART RATE: 63 BPM | WEIGHT: 275 LBS | OXYGEN SATURATION: 98 % | TEMPERATURE: 97.8 F | BODY MASS INDEX: 39.37 KG/M2 | SYSTOLIC BLOOD PRESSURE: 179 MMHG

## 2019-02-02 DIAGNOSIS — K08.89 PAIN, DENTAL: Primary | ICD-10-CM

## 2019-02-02 PROCEDURE — 99282 EMERGENCY DEPT VISIT SF MDM: CPT

## 2019-02-02 RX ORDER — OXYCODONE HYDROCHLORIDE AND ACETAMINOPHEN 5; 325 MG/1; MG/1
1 TABLET ORAL EVERY 6 HOURS PRN
Qty: 10 TABLET | Refills: 0 | Status: SHIPPED | OUTPATIENT
Start: 2019-02-02 | End: 2019-02-05

## 2019-02-02 ASSESSMENT — ENCOUNTER SYMPTOMS
COUGH: 0
VOMITING: 0
EYE DISCHARGE: 0
BACK PAIN: 0
EYE PAIN: 0
CHEST TIGHTNESS: 0
SHORTNESS OF BREATH: 0
WHEEZING: 0
CHOKING: 0
STRIDOR: 0
SORE THROAT: 0
ABDOMINAL PAIN: 0
SINUS PRESSURE: 0
BLOOD IN STOOL: 0
FACIAL SWELLING: 0
VOICE CHANGE: 0
DIARRHEA: 0
EYE REDNESS: 0
CONSTIPATION: 0
TROUBLE SWALLOWING: 0

## 2019-02-02 ASSESSMENT — PAIN SCALES - GENERAL: PAINLEVEL_OUTOF10: 8

## 2019-02-02 ASSESSMENT — PAIN DESCRIPTION - LOCATION: LOCATION: TEETH

## 2019-02-06 ENCOUNTER — HOSPITAL ENCOUNTER (EMERGENCY)
Age: 34
Discharge: HOME | End: 2019-02-06
Attending: EMERGENCY MEDICINE
Payer: COMMERCIAL

## 2019-02-06 VITALS
DIASTOLIC BLOOD PRESSURE: 85 MMHG | WEIGHT: 275 LBS | HEART RATE: 60 BPM | SYSTOLIC BLOOD PRESSURE: 164 MMHG | TEMPERATURE: 98 F | HEIGHT: 70 IN | RESPIRATION RATE: 16 BRPM | OXYGEN SATURATION: 99 % | BODY MASS INDEX: 39.37 KG/M2

## 2019-02-06 DIAGNOSIS — K08.89 PAIN, DENTAL: Primary | ICD-10-CM

## 2019-02-06 DIAGNOSIS — K05.10 GINGIVITIS: ICD-10-CM

## 2019-02-06 PROCEDURE — 99282 EMERGENCY DEPT VISIT SF MDM: CPT

## 2019-02-06 RX ORDER — OXYCODONE AND ACETAMINOPHEN 10; 325 MG/1; MG/1
2 TABLET ORAL
COMMUNITY
End: 2019-03-21

## 2019-02-06 RX ORDER — NAPROXEN 500 MG/1
500 TABLET ORAL 2 TIMES DAILY
Qty: 20 TABLET | Refills: 0 | Status: SHIPPED | OUTPATIENT
Start: 2019-02-06 | End: 2019-03-27 | Stop reason: ALTCHOICE

## 2019-02-06 ASSESSMENT — ENCOUNTER SYMPTOMS
SORE THROAT: 0
STRIDOR: 0
BLOOD IN STOOL: 0
EYE DISCHARGE: 0
CONSTIPATION: 0
CHOKING: 0
CHEST TIGHTNESS: 0
VOICE CHANGE: 0
WHEEZING: 0
TROUBLE SWALLOWING: 0
ABDOMINAL PAIN: 0
DIARRHEA: 0
VOMITING: 0
COUGH: 0
SHORTNESS OF BREATH: 0
FACIAL SWELLING: 0
EYE PAIN: 0
EYE REDNESS: 0
BACK PAIN: 0
SINUS PRESSURE: 0

## 2019-02-06 ASSESSMENT — PAIN DESCRIPTION - ONSET
ONSET: ON-GOING
ONSET: ON-GOING

## 2019-02-06 ASSESSMENT — PAIN DESCRIPTION - ORIENTATION: ORIENTATION: LOWER

## 2019-02-06 ASSESSMENT — PAIN DESCRIPTION - FREQUENCY
FREQUENCY: CONTINUOUS
FREQUENCY: INTERMITTENT

## 2019-02-06 ASSESSMENT — PAIN SCALES - GENERAL
PAINLEVEL_OUTOF10: 10
PAINLEVEL_OUTOF10: 5

## 2019-02-06 ASSESSMENT — PAIN DESCRIPTION - DESCRIPTORS
DESCRIPTORS: SHARP
DESCRIPTORS: SORE

## 2019-02-06 ASSESSMENT — PAIN DESCRIPTION - PAIN TYPE
TYPE: ACUTE PAIN
TYPE: ACUTE PAIN

## 2019-02-06 ASSESSMENT — PAIN - FUNCTIONAL ASSESSMENT
PAIN_FUNCTIONAL_ASSESSMENT: ACTIVITIES ARE NOT PREVENTED
PAIN_FUNCTIONAL_ASSESSMENT: 0-10

## 2019-02-06 ASSESSMENT — PAIN DESCRIPTION - PROGRESSION: CLINICAL_PROGRESSION: NOT CHANGED

## 2019-02-06 ASSESSMENT — PAIN DESCRIPTION - LOCATION
LOCATION: MOUTH
LOCATION: MOUTH

## 2019-02-26 ENCOUNTER — HOSPITAL ENCOUNTER (EMERGENCY)
Age: 34
Discharge: HOME | End: 2019-02-26
Attending: EMERGENCY MEDICINE
Payer: COMMERCIAL

## 2019-02-26 ENCOUNTER — APPOINTMENT (OUTPATIENT)
Dept: GENERAL RADIOLOGY | Age: 34
End: 2019-02-26
Payer: COMMERCIAL

## 2019-02-26 VITALS
BODY MASS INDEX: 39.37 KG/M2 | DIASTOLIC BLOOD PRESSURE: 85 MMHG | TEMPERATURE: 98 F | SYSTOLIC BLOOD PRESSURE: 154 MMHG | HEIGHT: 70 IN | WEIGHT: 275 LBS | OXYGEN SATURATION: 96 % | RESPIRATION RATE: 16 BRPM | HEART RATE: 95 BPM

## 2019-02-26 DIAGNOSIS — S30.0XXA COCCYGEAL CONTUSION, INITIAL ENCOUNTER: Primary | ICD-10-CM

## 2019-02-26 DIAGNOSIS — S63.502A LEFT WRIST SPRAIN, INITIAL ENCOUNTER: ICD-10-CM

## 2019-02-26 PROCEDURE — 6360000002 HC RX W HCPCS: Performed by: EMERGENCY MEDICINE

## 2019-02-26 PROCEDURE — 99283 EMERGENCY DEPT VISIT LOW MDM: CPT

## 2019-02-26 PROCEDURE — 73130 X-RAY EXAM OF HAND: CPT

## 2019-02-26 PROCEDURE — 72220 X-RAY EXAM SACRUM TAILBONE: CPT

## 2019-02-26 PROCEDURE — 96372 THER/PROPH/DIAG INJ SC/IM: CPT

## 2019-02-26 PROCEDURE — 73110 X-RAY EXAM OF WRIST: CPT

## 2019-02-26 PROCEDURE — 6370000000 HC RX 637 (ALT 250 FOR IP): Performed by: EMERGENCY MEDICINE

## 2019-02-26 RX ORDER — ORPHENADRINE CITRATE 30 MG/ML
60 INJECTION INTRAMUSCULAR; INTRAVENOUS ONCE
Status: COMPLETED | OUTPATIENT
Start: 2019-02-26 | End: 2019-02-26

## 2019-02-26 RX ORDER — ORPHENADRINE CITRATE 100 MG/1
100 TABLET, EXTENDED RELEASE ORAL 2 TIMES DAILY
Qty: 20 TABLET | Refills: 0 | Status: SHIPPED | OUTPATIENT
Start: 2019-02-26 | End: 2019-03-08

## 2019-02-26 RX ADMIN — LIDOCAINE HYDROCHLORIDE: 20 SOLUTION ORAL; TOPICAL at 20:12

## 2019-02-26 RX ADMIN — ORPHENADRINE CITRATE 60 MG: 30 INJECTION INTRAMUSCULAR; INTRAVENOUS at 19:56

## 2019-02-26 ASSESSMENT — ENCOUNTER SYMPTOMS
NAUSEA: 0
BACK PAIN: 0
COUGH: 0
SHORTNESS OF BREATH: 0
EYE PAIN: 0
ABDOMINAL DISTENTION: 0
WHEEZING: 0
DIARRHEA: 0
PHOTOPHOBIA: 0
VOMITING: 0
RHINORRHEA: 0
COLOR CHANGE: 0
ABDOMINAL PAIN: 0
SORE THROAT: 0
SINUS PRESSURE: 0
CONSTIPATION: 0
APNEA: 0

## 2019-02-26 ASSESSMENT — PAIN DESCRIPTION - ORIENTATION: ORIENTATION: LEFT

## 2019-02-26 ASSESSMENT — PAIN DESCRIPTION - LOCATION: LOCATION: WRIST

## 2019-03-21 ENCOUNTER — HOSPITAL ENCOUNTER (EMERGENCY)
Age: 34
Discharge: HOME | End: 2019-03-21
Attending: EMERGENCY MEDICINE
Payer: COMMERCIAL

## 2019-03-21 VITALS
TEMPERATURE: 98.6 F | OXYGEN SATURATION: 99 % | RESPIRATION RATE: 19 BRPM | SYSTOLIC BLOOD PRESSURE: 138 MMHG | DIASTOLIC BLOOD PRESSURE: 78 MMHG | HEART RATE: 100 BPM

## 2019-03-21 DIAGNOSIS — K08.89 PAIN, DENTAL: Primary | ICD-10-CM

## 2019-03-21 PROCEDURE — 6360000002 HC RX W HCPCS: Performed by: EMERGENCY MEDICINE

## 2019-03-21 PROCEDURE — 99282 EMERGENCY DEPT VISIT SF MDM: CPT

## 2019-03-21 PROCEDURE — 6370000000 HC RX 637 (ALT 250 FOR IP): Performed by: EMERGENCY MEDICINE

## 2019-03-21 PROCEDURE — 96372 THER/PROPH/DIAG INJ SC/IM: CPT

## 2019-03-21 RX ORDER — CHLORHEXIDINE GLUCONATE 0.12 MG/ML
15 RINSE ORAL 2 TIMES DAILY
Qty: 420 ML | Refills: 0 | Status: SHIPPED | OUTPATIENT
Start: 2019-03-21 | End: 2019-04-04

## 2019-03-21 RX ORDER — ORPHENADRINE CITRATE 30 MG/ML
60 INJECTION INTRAMUSCULAR; INTRAVENOUS ONCE
Status: COMPLETED | OUTPATIENT
Start: 2019-03-21 | End: 2019-03-21

## 2019-03-21 RX ORDER — OXYCODONE HYDROCHLORIDE AND ACETAMINOPHEN 5; 325 MG/1; MG/1
1-2 TABLET ORAL EVERY 6 HOURS PRN
Qty: 10 TABLET | Refills: 0 | Status: SHIPPED | OUTPATIENT
Start: 2019-03-21 | End: 2019-03-24

## 2019-03-21 RX ORDER — ORPHENADRINE CITRATE 100 MG/1
100 TABLET, EXTENDED RELEASE ORAL 2 TIMES DAILY
Qty: 20 TABLET | Refills: 0 | Status: SHIPPED | OUTPATIENT
Start: 2019-03-21 | End: 2019-03-31

## 2019-03-21 RX ADMIN — ORPHENADRINE CITRATE 60 MG: 30 INJECTION INTRAMUSCULAR; INTRAVENOUS at 22:06

## 2019-03-21 RX ADMIN — LIDOCAINE HYDROCHLORIDE 15 ML: 20 SOLUTION ORAL; TOPICAL at 22:06

## 2019-03-21 ASSESSMENT — PAIN DESCRIPTION - ORIENTATION
ORIENTATION: RIGHT
ORIENTATION: INNER

## 2019-03-21 ASSESSMENT — ENCOUNTER SYMPTOMS
ABDOMINAL PAIN: 0
SHORTNESS OF BREATH: 0
ABDOMINAL DISTENTION: 0
COUGH: 0
WHEEZING: 0
DIARRHEA: 0
PHOTOPHOBIA: 0
BACK PAIN: 0
NAUSEA: 0
COLOR CHANGE: 0
VOMITING: 0
SORE THROAT: 0
EYE PAIN: 0
APNEA: 0
SINUS PRESSURE: 0
CONSTIPATION: 0
RHINORRHEA: 0

## 2019-03-21 ASSESSMENT — PAIN DESCRIPTION - FREQUENCY
FREQUENCY: INTERMITTENT
FREQUENCY: CONTINUOUS

## 2019-03-21 ASSESSMENT — PAIN DESCRIPTION - PAIN TYPE
TYPE: ACUTE PAIN
TYPE: ACUTE PAIN

## 2019-03-21 ASSESSMENT — PAIN DESCRIPTION - ONSET
ONSET: ON-GOING
ONSET: ON-GOING

## 2019-03-21 ASSESSMENT — PAIN DESCRIPTION - PROGRESSION
CLINICAL_PROGRESSION: GRADUALLY IMPROVING
CLINICAL_PROGRESSION: NOT CHANGED

## 2019-03-21 ASSESSMENT — PAIN SCALES - GENERAL
PAINLEVEL_OUTOF10: 9
PAINLEVEL_OUTOF10: 7

## 2019-03-21 ASSESSMENT — PAIN DESCRIPTION - LOCATION
LOCATION: TEETH
LOCATION: TEETH

## 2019-03-21 ASSESSMENT — PAIN DESCRIPTION - DESCRIPTORS
DESCRIPTORS: SHARP
DESCRIPTORS: SHARP

## 2019-03-21 ASSESSMENT — PAIN - FUNCTIONAL ASSESSMENT: PAIN_FUNCTIONAL_ASSESSMENT: 0-10

## 2019-03-27 ENCOUNTER — HOSPITAL ENCOUNTER (EMERGENCY)
Age: 34
Discharge: HOME | End: 2019-03-27
Attending: EMERGENCY MEDICINE
Payer: COMMERCIAL

## 2019-03-27 VITALS
SYSTOLIC BLOOD PRESSURE: 173 MMHG | BODY MASS INDEX: 39.37 KG/M2 | HEIGHT: 70 IN | RESPIRATION RATE: 16 BRPM | WEIGHT: 275 LBS | HEART RATE: 99 BPM | DIASTOLIC BLOOD PRESSURE: 99 MMHG | OXYGEN SATURATION: 96 % | TEMPERATURE: 98.2 F

## 2019-03-27 DIAGNOSIS — F41.1 ANXIETY STATE: ICD-10-CM

## 2019-03-27 DIAGNOSIS — K13.79 ORAL PAIN: Primary | ICD-10-CM

## 2019-03-27 PROCEDURE — 99282 EMERGENCY DEPT VISIT SF MDM: CPT

## 2019-03-27 PROCEDURE — 6370000000 HC RX 637 (ALT 250 FOR IP): Performed by: EMERGENCY MEDICINE

## 2019-03-27 RX ORDER — NAPROXEN 500 MG/1
500 TABLET ORAL 2 TIMES DAILY
Qty: 20 TABLET | Refills: 0 | Status: SHIPPED | OUTPATIENT
Start: 2019-03-27 | End: 2019-04-26 | Stop reason: SDUPTHER

## 2019-03-27 RX ORDER — HYDROXYZINE PAMOATE 25 MG/1
50 CAPSULE ORAL 3 TIMES DAILY PRN
Qty: 20 CAPSULE | Refills: 0 | Status: SHIPPED | OUTPATIENT
Start: 2019-03-27 | End: 2019-05-09 | Stop reason: ALTCHOICE

## 2019-03-27 RX ORDER — LORAZEPAM 0.5 MG/1
0.5 TABLET ORAL ONCE
Status: COMPLETED | OUTPATIENT
Start: 2019-03-27 | End: 2019-03-27

## 2019-03-27 RX ADMIN — LORAZEPAM 0.5 MG: 0.5 TABLET ORAL at 00:26

## 2019-03-27 ASSESSMENT — ENCOUNTER SYMPTOMS
SHORTNESS OF BREATH: 0
COLOR CHANGE: 0
TROUBLE SWALLOWING: 0

## 2019-03-27 ASSESSMENT — PAIN DESCRIPTION - PAIN TYPE: TYPE: ACUTE PAIN

## 2019-03-27 ASSESSMENT — PAIN DESCRIPTION - LOCATION: LOCATION: MOUTH

## 2019-03-27 ASSESSMENT — PAIN DESCRIPTION - DESCRIPTORS: DESCRIPTORS: BURNING;SHARP

## 2019-03-27 ASSESSMENT — PAIN DESCRIPTION - ORIENTATION: ORIENTATION: RIGHT

## 2019-04-08 ENCOUNTER — HOSPITAL ENCOUNTER (EMERGENCY)
Age: 34
Discharge: HOME | End: 2019-04-08
Attending: EMERGENCY MEDICINE
Payer: COMMERCIAL

## 2019-04-08 VITALS
DIASTOLIC BLOOD PRESSURE: 80 MMHG | OXYGEN SATURATION: 94 % | TEMPERATURE: 97.9 F | RESPIRATION RATE: 14 BRPM | SYSTOLIC BLOOD PRESSURE: 145 MMHG | HEART RATE: 92 BPM

## 2019-04-08 DIAGNOSIS — R68.84 MANDIBULAR PAIN: Primary | ICD-10-CM

## 2019-04-08 PROCEDURE — 99282 EMERGENCY DEPT VISIT SF MDM: CPT

## 2019-04-08 RX ORDER — GLIPIZIDE 5 MG/1
5 TABLET ORAL
Qty: 10 TABLET | Refills: 0 | Status: SHIPPED | OUTPATIENT
Start: 2019-04-08 | End: 2019-04-26 | Stop reason: SDUPTHER

## 2019-04-08 RX ORDER — OXYCODONE HYDROCHLORIDE AND ACETAMINOPHEN 5; 325 MG/1; MG/1
1-2 TABLET ORAL EVERY 6 HOURS PRN
Qty: 15 TABLET | Refills: 0 | Status: SHIPPED | OUTPATIENT
Start: 2019-04-08 | End: 2019-04-11

## 2019-04-08 RX ORDER — AMOXICILLIN AND CLAVULANATE POTASSIUM 875; 125 MG/1; MG/1
1 TABLET, FILM COATED ORAL 2 TIMES DAILY
Qty: 20 TABLET | Refills: 0 | Status: SHIPPED | OUTPATIENT
Start: 2019-04-08 | End: 2019-04-18

## 2019-04-08 ASSESSMENT — PAIN DESCRIPTION - FREQUENCY: FREQUENCY: CONTINUOUS

## 2019-04-08 ASSESSMENT — PAIN DESCRIPTION - LOCATION: LOCATION: MOUTH

## 2019-04-08 ASSESSMENT — PAIN DESCRIPTION - DESCRIPTORS: DESCRIPTORS: SHARP;SHOOTING

## 2019-04-08 ASSESSMENT — PAIN DESCRIPTION - ORIENTATION: ORIENTATION: RIGHT

## 2019-04-08 ASSESSMENT — PAIN DESCRIPTION - PAIN TYPE: TYPE: ACUTE PAIN

## 2019-04-08 ASSESSMENT — PAIN DESCRIPTION - PROGRESSION: CLINICAL_PROGRESSION: GRADUALLY WORSENING

## 2019-04-09 NOTE — ED PROVIDER NOTES
Diagnosis Date    Diabetes mellitus ([facility] [address]     Hypertension          SURGICALHISTORY       Past Surgical History:   Procedure Laterality Date    HERNIA REPAIR      MOUTH SURGERY      TONSILLECTOMY AND ADENOIDECTOMY           CURRENT MEDICATIONS       Previous Medications    ACETAMINOPHEN (TYLENOL) 500 MG TABLET    Take 1,000 mg by mouth every 6 hours as needed for Pain    GLIPIZIDE (GLUCOTROL) 5 MG TABLET    Take 5 mg by mouth 2 times daily (before meals)    HYDROXYZINE (VISTARIL) 25 MG CAPSULE    Take 2 capsules by mouth 3 times daily as needed for Anxiety    METFORMIN (GLUCOPHAGE-XR) 500 MG EXTENDED RELEASE TABLET    Take 500 mg by mouth 2 times daily    METOPROLOL (TOPROL XL) 50 MG XL TABLET    Take 50 mg by mouth 2 times daily. NAPROXEN (NAPROSYN) 500 MG TABLET    Take 1 tablet by mouth 2 times daily       ALLERGIES     Buspar [buspirone]; Other; Clindamycin/lincomycin; Tramadol; and Ketorolac    FAMILY HISTORY     History reviewed. No pertinent family history.        SOCIAL HISTORY       Social History     Socioeconomic History    Marital status: Single     Spouse name: None    Number of children: None    Years of education: None    Highest education level: None   Occupational History    None   Social Needs    Financial resource strain: None    Food insecurity:     Worry: None     Inability: None    Transportation needs:     Medical: None     Non-medical: None   Tobacco Use    Smoking status: Current Every Day Smoker     Packs/day: 0.50     Types: Cigarettes    Smokeless tobacco: Never Used    Tobacco comment: trying to quit   Substance and Sexual Activity    Alcohol use: No    Drug use: No    Sexual activity: Yes     Partners: Female   Lifestyle    Physical activity:     Days per week: None     Minutes per session: None    Stress: None   Relationships    Social connections:     Talks on phone: None     Gets together: None     Attends Jew service: None     Active member of club or Nerves: Normal.                -Speech: Normal            DIAGNOSTIC RESULTS     EKG: All EKG's are interpreted by the Emergency Department Physician who either signs or Co-signsthis chart in the absence of a cardiologist.        RADIOLOGY:   Gulshan Amanda such as CT, Ultrasound and MRI are read by the radiologist. Plain radiographic images are visualized and preliminarily interpreted by the emergency physician with the below findings:        Interpretation per the Radiologist below, if available at the time ofthis note:    No orders to display         ED BEDSIDE ULTRASOUND:   Performed by ED Physician - none    LABS:  Labs Reviewed - No data to display    All other labs were within normal range or not returned as of this dictation. EMERGENCY DEPARTMENT COURSE and DIFFERENTIAL DIAGNOSIS/MDM:   Vitals:    Vitals:    04/08/19 1944   BP: (!) 145/80   Pulse: 92   Resp: 14   Temp: 97.9 °F (36.6 °C)   TempSrc: Oral   SpO2: 94%           MDM     We will treat for infection, he will see his dentist as soon as possible return for severe fever    CRITICAL CARE TIME   Total Critical Care time was  minutes, excluding separately reportableprocedures. There was a high probability of clinicallysignificant/life threatening deterioration in the patient's condition which required my urgent intervention. CONSULTS:  None    PROCEDURES:  Unless otherwise noted below, none     Procedures    FINAL IMPRESSION      1. Mandibular pain          DISPOSITION/PLAN   DISPOSITION Decision To Discharge 04/08/2019 09:07:41 PM      PATIENT REFERRED TO:  Dentist as soon as possible            DISCHARGE MEDICATIONS:  New Prescriptions    AMOXICILLIN-CLAVULANATE (AUGMENTIN) 875-125 MG PER TABLET    Take 1 tablet by mouth 2 times daily for 10 days    OXYCODONE-ACETAMINOPHEN (PERCOCET) 5-325 MG PER TABLET    Take 1-2 tablets by mouth every 6 hours as needed for Pain for up to 3 days. WARNING:  May cause drowsiness.   May impair ability to operate vehicles or machinery. Do not use in combination with alcohol.           (Please note that portions of this note were completed with a voice recognition program.  Efforts were made to edit the dictations but occasionally words are mis-transcribed.)    Barbara Hamman, MD (electronically signed)  Attending Emergency Physician          Barbara Hamman, MD  04/08/19 3530

## 2019-04-09 NOTE — ED NOTES
Pt asking if he can have a refill for his glipizide. Dr Doristine Ganser notified, states he will go and speak with pt.       [address] [facility], [address] [place] [address]  04/08/19 2128

## 2019-04-16 ENCOUNTER — HOSPITAL ENCOUNTER (EMERGENCY)
Age: 34
Discharge: HOME | End: 2019-04-16
Attending: EMERGENCY MEDICINE
Payer: COMMERCIAL

## 2019-04-16 VITALS
RESPIRATION RATE: 16 BRPM | OXYGEN SATURATION: 98 % | HEART RATE: 98 BPM | WEIGHT: 275 LBS | HEIGHT: 70 IN | DIASTOLIC BLOOD PRESSURE: 78 MMHG | BODY MASS INDEX: 39.37 KG/M2 | SYSTOLIC BLOOD PRESSURE: 143 MMHG | TEMPERATURE: 99.1 F

## 2019-04-16 DIAGNOSIS — M27.2 INFECTION OF MANDIBLE: Primary | ICD-10-CM

## 2019-04-16 PROCEDURE — 99282 EMERGENCY DEPT VISIT SF MDM: CPT

## 2019-04-16 PROCEDURE — 6360000002 HC RX W HCPCS: Performed by: EMERGENCY MEDICINE

## 2019-04-16 PROCEDURE — 96372 THER/PROPH/DIAG INJ SC/IM: CPT

## 2019-04-16 PROCEDURE — 6370000000 HC RX 637 (ALT 250 FOR IP): Performed by: EMERGENCY MEDICINE

## 2019-04-16 RX ORDER — SULFAMETHOXAZOLE AND TRIMETHOPRIM 800; 160 MG/1; MG/1
1 TABLET ORAL 2 TIMES DAILY
Qty: 14 TABLET | Refills: 0 | Status: SHIPPED | OUTPATIENT
Start: 2019-04-16 | End: 2019-04-23

## 2019-04-16 RX ORDER — CEPHALEXIN 500 MG/1
500 CAPSULE ORAL 4 TIMES DAILY
Qty: 28 CAPSULE | Refills: 0 | Status: SHIPPED | OUTPATIENT
Start: 2019-04-16 | End: 2019-04-23

## 2019-04-16 RX ORDER — OXYCODONE HYDROCHLORIDE AND ACETAMINOPHEN 5; 325 MG/1; MG/1
2 TABLET ORAL ONCE
Status: COMPLETED | OUTPATIENT
Start: 2019-04-16 | End: 2019-04-16

## 2019-04-16 RX ORDER — OXYCODONE HYDROCHLORIDE AND ACETAMINOPHEN 5; 325 MG/1; MG/1
1-2 TABLET ORAL EVERY 6 HOURS PRN
Qty: 18 TABLET | Refills: 0 | Status: SHIPPED | OUTPATIENT
Start: 2019-04-16 | End: 2019-04-19

## 2019-04-16 RX ORDER — SULFAMETHOXAZOLE AND TRIMETHOPRIM 800; 160 MG/1; MG/1
1 TABLET ORAL ONCE
Status: COMPLETED | OUTPATIENT
Start: 2019-04-16 | End: 2019-04-16

## 2019-04-16 RX ORDER — CEFAZOLIN SODIUM 1 G/3ML
1 INJECTION, POWDER, FOR SOLUTION INTRAMUSCULAR; INTRAVENOUS ONCE
Status: COMPLETED | OUTPATIENT
Start: 2019-04-16 | End: 2019-04-16

## 2019-04-16 RX ADMIN — OXYCODONE HYDROCHLORIDE AND ACETAMINOPHEN 2 TABLET: 5; 325 TABLET ORAL at 23:01

## 2019-04-16 RX ADMIN — SULFAMETHOXAZOLE AND TRIMETHOPRIM 1 TABLET: 800; 160 TABLET ORAL at 23:01

## 2019-04-16 RX ADMIN — CEFAZOLIN 1 G: 1 INJECTION, POWDER, FOR SOLUTION INTRAMUSCULAR; INTRAVENOUS at 23:01

## 2019-04-16 ASSESSMENT — PAIN DESCRIPTION - FREQUENCY
FREQUENCY: CONTINUOUS
FREQUENCY: CONTINUOUS

## 2019-04-16 ASSESSMENT — PAIN DESCRIPTION - LOCATION
LOCATION: MOUTH
LOCATION: MOUTH

## 2019-04-16 ASSESSMENT — PAIN DESCRIPTION - ONSET
ONSET: ON-GOING
ONSET: ON-GOING

## 2019-04-16 ASSESSMENT — PAIN DESCRIPTION - ORIENTATION
ORIENTATION: RIGHT;LOWER
ORIENTATION: RIGHT;LOWER

## 2019-04-16 ASSESSMENT — PAIN SCALES - GENERAL
PAINLEVEL_OUTOF10: 9
PAINLEVEL_OUTOF10: 10

## 2019-04-16 ASSESSMENT — PAIN DESCRIPTION - DESCRIPTORS
DESCRIPTORS: SHARP
DESCRIPTORS: SHARP

## 2019-04-17 ENCOUNTER — TELEPHONE (OUTPATIENT)
Dept: FAMILY MEDICINE CLINIC | Age: 34
End: 2019-04-17

## 2019-04-17 NOTE — ED PROVIDER NOTES
[address] [place] [address] ED  eMERGENCY dEPARTMENT eNCOUnter      Pt Name: Gama Montemayor  MRN: [id number]  [place] 1985  Date of evaluation: 4/16/2019  Provider: Estefania Lim MD    CHIEF COMPLAINT       Chief Complaint   Patient presents with    Dental Pain     bottom right tooth pain         HISTORY OF PRESENT ILLNESS   (Location/Symptom, Timing/Onset,Context/Setting, Quality, Duration, Modifying Factors, Severity)  Note limiting factors. Gama Montemayor is a 37 y.o. male who presents to the emergency department with history of gingival/mandibular infection status post dental extraction. He is been attempting to go to an oral surgeon as directed by 1 or 2 emergency room doctors however has been refused because of insurance situation. He is done with his Augmentin, he is done with his pain medication and still is no better. Fever and malaise is not present. He is attempting to get this treated as soon as possible. Night sweats are not present. He is a diabetic. Sugars are running well.   's teeth were extracted by an oral surgeon which he did not particularly like, he has not tried to go back to see the oral surgeon whose name was Dr. Dorothea Roberts, Dr. Dorothea Roberts is attempting to get him fitted for dentures    HPI    NursingNotes were reviewed. REVIEW OF SYSTEMS    (2-9 systems for level 4, 10 or more for level 5)     Review of Systems     Constitutional symptoms:  no Fatigue, no fever, no chills. Skin symptoms:  Negative except as documented in HPI. ENMT symptoms:  Negative except as documented in HPI. Gingival/mandibular pain as above  Respiratory symptoms:  Negative except as documented in HPI. Cardiovascular symptoms:  Negative except as documented in HPI. Gastrointestinal symptoms: Negative except for documented as above in the HPI   Genitourinary symptoms:  Negative except as documented in HPI. Musculoskeletal symptoms:  Negative except as documented in HPI.    Neurologic symptoms:  Negative except as documented in HPI. Remainder of 10 systems, all negative except for mentioned above      Except as noted above the remainder of the review of systems was reviewed and negative. PAST MEDICAL HISTORY     Past Medical History:   Diagnosis Date    Diabetes mellitus ([address]     Hypertension          SURGICALHISTORY       Past Surgical History:   Procedure Laterality Date    HERNIA REPAIR      MOUTH SURGERY      TONSILLECTOMY AND ADENOIDECTOMY           CURRENT MEDICATIONS       Previous Medications    ACETAMINOPHEN (TYLENOL) 500 MG TABLET    Take 1,000 mg by mouth every 6 hours as needed for Pain    AMOXICILLIN-CLAVULANATE (AUGMENTIN) 875-125 MG PER TABLET    Take 1 tablet by mouth 2 times daily for 10 days    GLIPIZIDE (GLUCOTROL) 5 MG TABLET    Take 1 tablet by mouth 2 times daily (before meals)    HYDROXYZINE (VISTARIL) 25 MG CAPSULE    Take 2 capsules by mouth 3 times daily as needed for Anxiety    METFORMIN (GLUCOPHAGE-XR) 500 MG EXTENDED RELEASE TABLET    Take 500 mg by mouth 2 times daily    METOPROLOL (TOPROL XL) 50 MG XL TABLET    Take 50 mg by mouth 2 times daily. NAPROXEN (NAPROSYN) 500 MG TABLET    Take 1 tablet by mouth 2 times daily       ALLERGIES     Buspar [buspirone]; Other; Clindamycin/lincomycin; Tramadol; and Ketorolac    FAMILY HISTORY     History reviewed. No pertinent family history.        SOCIAL HISTORY       Social History     Socioeconomic History    Marital status: Single     Spouse name: None    Number of children: None    Years of education: None    Highest education level: None   Occupational History    None   Social Needs    Financial resource strain: None    Food insecurity:     Worry: None     Inability: None    Transportation needs:     Medical: None     Non-medical: None   Tobacco Use    Smoking status: Current Every Day Smoker     Packs/day: 0.50     Types: Cigarettes    Smokeless tobacco: Never Used    Tobacco comment: trying to quit   Substance and Sexual Activity    Alcohol use: No    Drug use: No    Sexual activity: Yes     Partners: Female   Lifestyle    Physical activity:     Days per week: None     Minutes per session: None    Stress: None   Relationships    Social connections:     Talks on phone: None     Gets together: None     Attends Alevism service: None     Active member of club or organization: None     Attends meetings of clubs or organizations: None     Relationship status: None    Intimate partner violence:     Fear of current or ex partner: None     Emotionally abused: None     Physically abused: None     Forced sexual activity: None   Other Topics Concern    None   Social History Narrative    None       SCREENINGS      @FLOW([id number])@      PHYSICAL EXAM    (up to 7 for level 4, 8 or more for level 5)     ED Triage Vitals [04/16/19 2145]   BP Temp Temp Source Pulse Resp SpO2 Height Weight   (!) 167/86 99.2 °F (37.3 °C) Oral 94 16 96 % 5' 10\" (1.778 m) 275 lb (124.7 kg)       Physical Exam     CONST: -Well-developed well-nourished ;                -In no acute distress. -Vitals reviewed. EYES: -EOM intact, LUIS:              -Sclera normal and conjunctiva: clear bilaterally. ENT: - Normal pharynx pink and moist.  Patient is edentulous, patient does have an area of gingiva which is quite tender and mildly swollen, right lower mandibular region, gingiva. This is approximately 2 x 1 CMNECK: -Supple (chin-to-chest).     CARD: -Rate and rhythm: Regular              -Murmurs: No  RESP: -Respiratory effort and chest excursion with respirations: Normal             -Breath sounds equal bilaterally: Clear             -Wheezes: No             -Rales: No    BACK: -Flank pain: No              -Pain on palpation: No    ABD: -Distended: No           -Bruits: No           -Bowel sounds: Normal.           -Deep palpation: Non-tender           -Organomegaly palpable: No           -Abnormal masses: No    EXT: Gross appearance and use of all

## 2019-04-17 NOTE — ED NOTES
Discharge instructions given. Patient verbalizes understanding and denies any questions. Scripts x 3 given. Patient ambulating with steady gait upon discharge.         Sangeeta Mancia RN  04/16/19 5270

## 2019-04-22 ENCOUNTER — APPOINTMENT (OUTPATIENT)
Dept: GENERAL RADIOLOGY | Age: 34
End: 2019-04-22
Payer: COMMERCIAL

## 2019-04-22 ENCOUNTER — HOSPITAL ENCOUNTER (EMERGENCY)
Age: 34
Discharge: HOME | End: 2019-04-22
Attending: EMERGENCY MEDICINE
Payer: COMMERCIAL

## 2019-04-22 VITALS
BODY MASS INDEX: 39.37 KG/M2 | DIASTOLIC BLOOD PRESSURE: 70 MMHG | HEART RATE: 100 BPM | HEIGHT: 70 IN | TEMPERATURE: 98.7 F | RESPIRATION RATE: 16 BRPM | SYSTOLIC BLOOD PRESSURE: 132 MMHG | OXYGEN SATURATION: 95 % | WEIGHT: 275 LBS

## 2019-04-22 DIAGNOSIS — K13.79 ORAL PAIN: ICD-10-CM

## 2019-04-22 DIAGNOSIS — S00.83XA FACIAL CONTUSION, INITIAL ENCOUNTER: Primary | ICD-10-CM

## 2019-04-22 PROCEDURE — 70110 X-RAY EXAM OF JAW 4/> VIEWS: CPT

## 2019-04-22 PROCEDURE — 99283 EMERGENCY DEPT VISIT LOW MDM: CPT

## 2019-04-22 ASSESSMENT — PAIN DESCRIPTION - PAIN TYPE: TYPE: ACUTE PAIN

## 2019-04-22 ASSESSMENT — PAIN SCALES - GENERAL: PAINLEVEL_OUTOF10: 7

## 2019-04-22 ASSESSMENT — PAIN DESCRIPTION - ORIENTATION: ORIENTATION: RIGHT

## 2019-04-22 ASSESSMENT — PAIN DESCRIPTION - ONSET: ONSET: SUDDEN

## 2019-04-22 ASSESSMENT — PAIN DESCRIPTION - LOCATION: LOCATION: FACE

## 2019-04-22 ASSESSMENT — ENCOUNTER SYMPTOMS: COLOR CHANGE: 0

## 2019-04-22 ASSESSMENT — PAIN DESCRIPTION - FREQUENCY: FREQUENCY: INTERMITTENT

## 2019-04-22 ASSESSMENT — PAIN DESCRIPTION - DESCRIPTORS: DESCRIPTORS: THROBBING

## 2019-04-22 ASSESSMENT — PAIN - FUNCTIONAL ASSESSMENT: PAIN_FUNCTIONAL_ASSESSMENT: ACTIVITIES ARE NOT PREVENTED

## 2019-04-23 ENCOUNTER — HOSPITAL ENCOUNTER (EMERGENCY)
Age: 34
Discharge: HOME | End: 2019-04-23
Attending: EMERGENCY MEDICINE
Payer: COMMERCIAL

## 2019-04-23 VITALS
WEIGHT: 275 LBS | BODY MASS INDEX: 39.37 KG/M2 | RESPIRATION RATE: 20 BRPM | OXYGEN SATURATION: 93 % | HEIGHT: 70 IN | HEART RATE: 82 BPM | SYSTOLIC BLOOD PRESSURE: 146 MMHG | DIASTOLIC BLOOD PRESSURE: 70 MMHG | TEMPERATURE: 98.8 F

## 2019-04-23 DIAGNOSIS — G89.29 CHRONIC JAW PAIN: Primary | ICD-10-CM

## 2019-04-23 DIAGNOSIS — R68.84 CHRONIC JAW PAIN: Primary | ICD-10-CM

## 2019-04-23 PROCEDURE — 6370000000 HC RX 637 (ALT 250 FOR IP): Performed by: EMERGENCY MEDICINE

## 2019-04-23 PROCEDURE — 99283 EMERGENCY DEPT VISIT LOW MDM: CPT

## 2019-04-23 RX ORDER — IBUPROFEN 400 MG/1
800 TABLET ORAL ONCE
Status: DISCONTINUED | OUTPATIENT
Start: 2019-04-23 | End: 2019-04-24 | Stop reason: HOSPADM

## 2019-04-23 RX ORDER — OXYCODONE HYDROCHLORIDE AND ACETAMINOPHEN 5; 325 MG/1; MG/1
2 TABLET ORAL ONCE
Status: COMPLETED | OUTPATIENT
Start: 2019-04-23 | End: 2019-04-23

## 2019-04-23 RX ORDER — CHLORHEXIDINE GLUCONATE 0.12 MG/ML
15 RINSE ORAL 2 TIMES DAILY
Qty: 420 ML | Refills: 0 | Status: SHIPPED | OUTPATIENT
Start: 2019-04-23 | End: 2019-05-07

## 2019-04-23 RX ADMIN — OXYCODONE AND ACETAMINOPHEN 2 TABLET: 5; 325 TABLET ORAL at 22:43

## 2019-04-23 ASSESSMENT — PAIN SCALES - GENERAL: PAINLEVEL_OUTOF10: 7

## 2019-04-23 ASSESSMENT — ENCOUNTER SYMPTOMS
ABDOMINAL PAIN: 0
SINUS PRESSURE: 0
PHOTOPHOBIA: 0
WHEEZING: 0
COLOR CHANGE: 0
BACK PAIN: 0
SHORTNESS OF BREATH: 0
COUGH: 0
EYE PAIN: 0
CONSTIPATION: 0
ABDOMINAL DISTENTION: 0
APNEA: 0
VOMITING: 0
RHINORRHEA: 0
NAUSEA: 0
DIARRHEA: 0
SORE THROAT: 0

## 2019-04-23 ASSESSMENT — PAIN - FUNCTIONAL ASSESSMENT: PAIN_FUNCTIONAL_ASSESSMENT: ACTIVITIES ARE NOT PREVENTED

## 2019-04-23 ASSESSMENT — PAIN DESCRIPTION - PROGRESSION: CLINICAL_PROGRESSION: NOT CHANGED

## 2019-04-23 ASSESSMENT — PAIN DESCRIPTION - ONSET: ONSET: GRADUAL

## 2019-04-23 ASSESSMENT — PAIN DESCRIPTION - FREQUENCY: FREQUENCY: INTERMITTENT

## 2019-04-23 ASSESSMENT — PAIN DESCRIPTION - PAIN TYPE: TYPE: ACUTE PAIN

## 2019-04-23 ASSESSMENT — PAIN DESCRIPTION - ORIENTATION: ORIENTATION: RIGHT

## 2019-04-23 ASSESSMENT — PAIN DESCRIPTION - LOCATION: LOCATION: JAW

## 2019-04-23 ASSESSMENT — PAIN DESCRIPTION - DESCRIPTORS: DESCRIPTORS: THROBBING

## 2019-04-23 NOTE — ED NOTES
Explained discharge instructions to patient. Went over discharge diagnosis and pertinent educational material with patient. Patient stated understanding of discharge diagnosis, instructions, and follow up. Patient denies any questions at this time, all concerns addressed. No signs or symptoms of pain or distress noted at this time. Patient discharged to home. A/0 x3, ambulatory, resps even and unlabored on room air. Ice pack provided. Follow up instructions and reasons to return to ER reviewed.       Maria Victoria De Luna RN  04/22/19 0830
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
## 2019-04-24 NOTE — ED NOTES
Explained discharge instructions and one prescription to patient. Went over discharge diagnosis and pertinent educational material with patient. Patient stated understanding of discharge diagnosis, instructions, and prescription. Patient denies any questions at this time, all concerns addressed. No signs or symptoms of pain or distress noted at this time. Patient discharged to home, patient states he is not driving - his stepfather will be transporting. A/0 x3, ambulatory, resps even and unlabored on room air. Follow up instructions and reasons to return to ER reviewed. Dental instructions and referral info provided - [facility] and dentistry brochure and paperwork given.       Paula Rivera RN  04/23/19 8674
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
## 2019-04-26 ENCOUNTER — HOSPITAL ENCOUNTER (EMERGENCY)
Age: 34
Discharge: HOME | End: 2019-04-26
Attending: EMERGENCY MEDICINE
Payer: COMMERCIAL

## 2019-04-26 VITALS
HEIGHT: 69 IN | DIASTOLIC BLOOD PRESSURE: 81 MMHG | HEART RATE: 90 BPM | OXYGEN SATURATION: 95 % | SYSTOLIC BLOOD PRESSURE: 146 MMHG | TEMPERATURE: 98.4 F | WEIGHT: 275 LBS | BODY MASS INDEX: 40.73 KG/M2

## 2019-04-26 DIAGNOSIS — Z76.0 ENCOUNTER FOR MEDICATION REFILL: ICD-10-CM

## 2019-04-26 DIAGNOSIS — K08.89 PAIN, DENTAL: Primary | ICD-10-CM

## 2019-04-26 PROCEDURE — 99282 EMERGENCY DEPT VISIT SF MDM: CPT

## 2019-04-26 PROCEDURE — 6370000000 HC RX 637 (ALT 250 FOR IP): Performed by: EMERGENCY MEDICINE

## 2019-04-26 RX ORDER — ACETAMINOPHEN 500 MG
1000 TABLET ORAL ONCE
Status: COMPLETED | OUTPATIENT
Start: 2019-04-26 | End: 2019-04-26

## 2019-04-26 RX ORDER — METFORMIN HYDROCHLORIDE 500 MG/1
500 TABLET, EXTENDED RELEASE ORAL 2 TIMES DAILY
Qty: 60 TABLET | Refills: 0 | Status: SHIPPED | OUTPATIENT
Start: 2019-04-26 | End: 2019-05-31 | Stop reason: SDUPTHER

## 2019-04-26 RX ORDER — METOPROLOL TARTRATE 50 MG/1
50 TABLET, FILM COATED ORAL 2 TIMES DAILY
Qty: 60 TABLET | Refills: 0 | Status: SHIPPED | OUTPATIENT
Start: 2019-04-26 | End: 2019-05-31 | Stop reason: SDUPTHER

## 2019-04-26 RX ORDER — GLIPIZIDE 5 MG/1
5 TABLET ORAL
Qty: 60 TABLET | Refills: 0 | Status: SHIPPED | OUTPATIENT
Start: 2019-04-26 | End: 2019-05-31 | Stop reason: SDUPTHER

## 2019-04-26 RX ORDER — NAPROXEN 500 MG/1
500 TABLET ORAL 2 TIMES DAILY
Qty: 20 TABLET | Refills: 0 | Status: SHIPPED | OUTPATIENT
Start: 2019-04-26 | End: 2019-05-09 | Stop reason: ALTCHOICE

## 2019-04-26 RX ORDER — METOPROLOL SUCCINATE 50 MG/1
50 TABLET, EXTENDED RELEASE ORAL 2 TIMES DAILY
Qty: 60 TABLET | Refills: 0 | Status: SHIPPED | OUTPATIENT
Start: 2019-04-26 | End: 2019-04-26 | Stop reason: CLARIF

## 2019-04-26 RX ADMIN — LIDOCAINE HYDROCHLORIDE 15 ML: 20 SOLUTION ORAL; TOPICAL at 21:19

## 2019-04-26 RX ADMIN — ACETAMINOPHEN 1000 MG: 500 TABLET, FILM COATED ORAL at 21:19

## 2019-04-26 ASSESSMENT — PAIN - FUNCTIONAL ASSESSMENT: PAIN_FUNCTIONAL_ASSESSMENT: ACTIVITIES ARE NOT PREVENTED

## 2019-04-26 ASSESSMENT — ENCOUNTER SYMPTOMS
BACK PAIN: 0
ABDOMINAL DISTENTION: 0
ABDOMINAL PAIN: 0
SORE THROAT: 0
SHORTNESS OF BREATH: 0
DIARRHEA: 0
RHINORRHEA: 0
COLOR CHANGE: 0
CONSTIPATION: 0
SINUS PRESSURE: 0
NAUSEA: 0
PHOTOPHOBIA: 0
APNEA: 0
WHEEZING: 0
VOMITING: 0
COUGH: 0
EYE PAIN: 0

## 2019-04-26 ASSESSMENT — PAIN DESCRIPTION - DESCRIPTORS: DESCRIPTORS: ACHING

## 2019-04-26 ASSESSMENT — PAIN DESCRIPTION - ORIENTATION: ORIENTATION: RIGHT

## 2019-04-26 ASSESSMENT — PAIN DESCRIPTION - FREQUENCY: FREQUENCY: INTERMITTENT

## 2019-04-26 ASSESSMENT — PAIN DESCRIPTION - ONSET: ONSET: ON-GOING

## 2019-04-26 ASSESSMENT — PAIN SCALES - GENERAL
PAINLEVEL_OUTOF10: 8
PAINLEVEL_OUTOF10: 8

## 2019-04-26 ASSESSMENT — PAIN DESCRIPTION - LOCATION: LOCATION: TEETH

## 2019-04-26 ASSESSMENT — PAIN DESCRIPTION - PAIN TYPE: TYPE: ACUTE PAIN

## 2019-04-29 ENCOUNTER — HOSPITAL ENCOUNTER (EMERGENCY)
Age: 34
Discharge: HOME | End: 2019-04-29
Attending: EMERGENCY MEDICINE
Payer: COMMERCIAL

## 2019-04-29 VITALS
OXYGEN SATURATION: 95 % | RESPIRATION RATE: 17 BRPM | WEIGHT: 275 LBS | SYSTOLIC BLOOD PRESSURE: 161 MMHG | TEMPERATURE: 99.2 F | HEART RATE: 104 BPM | HEIGHT: 70 IN | BODY MASS INDEX: 39.37 KG/M2 | DIASTOLIC BLOOD PRESSURE: 95 MMHG

## 2019-04-29 DIAGNOSIS — K13.79 ORAL PAIN: Primary | ICD-10-CM

## 2019-04-29 PROCEDURE — 99282 EMERGENCY DEPT VISIT SF MDM: CPT

## 2019-04-29 RX ORDER — DEXAMETHASONE SODIUM PHOSPHATE 10 MG/ML
10 INJECTION, SOLUTION INTRAMUSCULAR; INTRAVENOUS ONCE
Status: DISCONTINUED | OUTPATIENT
Start: 2019-04-29 | End: 2019-04-30 | Stop reason: HOSPADM

## 2019-04-29 ASSESSMENT — PAIN - FUNCTIONAL ASSESSMENT: PAIN_FUNCTIONAL_ASSESSMENT: INTOLERABLE, UNABLE TO DO ANY ACTIVE OR PASSIVE ACTIVITIES

## 2019-04-29 ASSESSMENT — PAIN DESCRIPTION - ONSET: ONSET: ON-GOING

## 2019-04-29 ASSESSMENT — PAIN DESCRIPTION - FREQUENCY: FREQUENCY: CONTINUOUS

## 2019-04-29 ASSESSMENT — PAIN DESCRIPTION - DESCRIPTORS: DESCRIPTORS: SHARP

## 2019-04-29 ASSESSMENT — PAIN DESCRIPTION - LOCATION: LOCATION: JAW

## 2019-04-29 ASSESSMENT — PAIN DESCRIPTION - PAIN TYPE: TYPE: CHRONIC PAIN

## 2019-04-29 ASSESSMENT — PAIN SCALES - GENERAL: PAINLEVEL_OUTOF10: 9

## 2019-04-29 ASSESSMENT — PAIN DESCRIPTION - PROGRESSION: CLINICAL_PROGRESSION: GRADUALLY WORSENING

## 2019-04-29 ASSESSMENT — PAIN DESCRIPTION - ORIENTATION: ORIENTATION: RIGHT

## 2019-04-30 NOTE — ED PROVIDER NOTES
Hypertension          SURGICALHISTORY       Past Surgical History:   Procedure Laterality Date    HERNIA REPAIR      MOUTH SURGERY      TONSILLECTOMY AND ADENOIDECTOMY           CURRENT MEDICATIONS       Previous Medications    ACETAMINOPHEN (TYLENOL) 500 MG TABLET    Take 1,000 mg by mouth every 6 hours as needed for Pain    CHLORHEXIDINE (PERIDEX) 0.12 % SOLUTION    Take 15 mLs by mouth 2 times daily for 14 days    GLIPIZIDE (GLUCOTROL) 5 MG TABLET    Take 1 tablet by mouth 2 times daily (before meals)    HYDROXYZINE (VISTARIL) 25 MG CAPSULE    Take 2 capsules by mouth 3 times daily as needed for Anxiety    LIDOCAINE VISCOUS (XYLOCAINE) 2 % SOLUTION    Take 15 mLs by mouth as needed for Irritation    METFORMIN (GLUCOPHAGE-XR) 500 MG EXTENDED RELEASE TABLET    Take 1 tablet by mouth 2 times daily    METOPROLOL TARTRATE (LOPRESSOR) 50 MG TABLET    Take 1 tablet by mouth 2 times daily    NAPROXEN (NAPROSYN) 500 MG TABLET    Take 1 tablet by mouth 2 times daily       ALLERGIES     Buspar [buspirone]; Other; Clindamycin/lincomycin; Tramadol; and Ketorolac    FAMILY HISTORY     No family history on file.        SOCIAL HISTORY       Social History     Socioeconomic History    Marital status: Single     Spouse name: Not on file    Number of children: Not on file    Years of education: Not on file    Highest education level: Not on file   Occupational History    Not on file   Social Needs    Financial resource strain: Not on file    Food insecurity:     Worry: Not on file     Inability: Not on file    Transportation needs:     Medical: Not on file     Non-medical: Not on file   Tobacco Use    Smoking status: Current Every Day Smoker     Packs/day: 0.50     Types: Cigarettes    Smokeless tobacco: Never Used    Tobacco comment: trying to quit   Substance and Sexual Activity    Alcohol use: No    Drug use: No    Sexual activity: Yes     Partners: Female   Lifestyle    Physical activity:     Days per week: Not on file     Minutes per session: Not on file    Stress: Not on file   Relationships    Social connections:     Talks on phone: Not on file     Gets together: Not on file     Attends Yazdanism service: Not on file     Active member of club or organization: Not on file     Attends meetings of clubs or organizations: Not on file     Relationship status: Not on file    Intimate partner violence:     Fear of current or ex partner: Not on file     Emotionally abused: Not on file     Physically abused: Not on file     Forced sexual activity: Not on file   Other Topics Concern    Not on file   Social History Narrative    Not on file       SCREENINGS      @ZVBW([id number])@      PHYSICAL EXAM    (up to 7 for level 4, 8 or more for level 5)     ED Triage Vitals [04/29/19 2249]   BP Temp Temp Source Pulse Resp SpO2 Height Weight   (!) 161/95 99.2 °F (37.3 °C) Oral 104 17 95 % 5' 10\" (1.778 m) 275 lb (124.7 kg)       Physical Exam   CONST: -Well-developed well-nourished ;                -In no acute distress. -Vitals reviewed. EYES: -EOM intact, LUIS:              -Sclera normal and conjunctiva: clear bilaterally. ENT: - Normal pharynx pink and moist.  There is no sign of overt infection in the gingiva. Patient has no teeth. There is no swelling. There seems to be tenderness in the molar area of the right lower jaw. NECK: -Supple (chin-to-chest).     CARD: -Rate and rhythm: Regular              -Murmurs: No  RESP: -Respiratory effort and chest excursion with respirations: Normal             -Breath sounds equal bilaterally: Clear             -Wheezes: No             -Rales: No    BACK: -Flank pain: No              -Pain on palpation: No    ABD: -Distended: No           -Bruits: No           -Bowel sounds: Normal.           -Deep palpation: Non-tender           -Organomegaly palpable: No           -Abnormal masses: No    EXT: Gross appearance and use of all four extremities: Normal     SKIN: -Good turgor warm and dry. -Apparent lesions or rashes: No    NEURO: -Patient: alert                -Oriented to: person, place and time. -Appearance and judgment: appropriate.                -Cranial Nerves: Normal.                -Speech: Normal            DIAGNOSTIC RESULTS     EKG: All EKG's are interpreted by the Emergency Department Physician who either signs or Co-signsthis chart in the absence of a cardiologist.        RADIOLOGY:   Non-plain filmimages such as CT, Ultrasound and MRI are read by the radiologist. Plain radiographic images are visualized and preliminarily interpreted by the emergency physician with the below findings:    All conversation took place in presence of nursing staff. Police were in attendance because of threatening behavior in the past.  He was informed that he was not going to be receiving narcotics for this dental problem in the future. He is to see his dentist on Friday as he has discussed    Interpretation per the Radiologist below, if available at the time ofthis note:    No orders to display         ED BEDSIDE ULTRASOUND:   Performed by ED Physician - none    LABS:  Labs Reviewed - No data to display    All other labs were within normal range or not returned as of this dictation. EMERGENCY DEPARTMENT COURSE and DIFFERENTIAL DIAGNOSIS/MDM:   Vitals:    Vitals:    04/29/19 2249   BP: (!) 161/95   Pulse: 104   Resp: 17   Temp: 99.2 °F (37.3 °C)   TempSrc: Oral   SpO2: 95%   Weight: 275 lb (124.7 kg)   Height: 5' 10\" (1.778 m)           MDM    CRITICAL CARE TIME   Total Critical Care time was  minutes, excluding separately reportableprocedures. There was a high probability of clinicallysignificant/life threatening deterioration in the patient's condition which required my urgent intervention. CONSULTS:  None    PROCEDURES:  Unless otherwise noted below, none     Procedures    FINAL IMPRESSION      1.  Oral pain          DISPOSITION/PLAN   DISPOSITION Decision To Discharge 04/29/2019 11:18:46 PM      PATIENT REFERRED TO:  See your dentist on Friday as scheduled            DISCHARGE MEDICATIONS:  New Prescriptions    No medications on file          (Please note that portions of this note were completed with a voice recognition program.  Efforts were made to edit the dictations but occasionally words are mis-transcribed.)    Jarod Godwin MD (electronically signed)  Attending Emergency Physician          Jarod Godwin MD  04/29/19 1148

## 2019-04-30 NOTE — ED NOTES
Pt got up to go to the restroom and complained that the Restroom was dirty and asked for another and he left the ED to use that restroom and walked out of the hospital     Luiz Jackson RN  04/30/19 7853

## 2019-05-09 ENCOUNTER — OFFICE VISIT (OUTPATIENT)
Dept: FAMILY MEDICINE CLINIC | Age: 34
End: 2019-05-09
Payer: COMMERCIAL

## 2019-05-09 VITALS
WEIGHT: 298 LBS | HEART RATE: 85 BPM | SYSTOLIC BLOOD PRESSURE: 142 MMHG | OXYGEN SATURATION: 97 % | DIASTOLIC BLOOD PRESSURE: 82 MMHG | BODY MASS INDEX: 42.66 KG/M2 | TEMPERATURE: 96.8 F | HEIGHT: 70 IN | RESPIRATION RATE: 14 BRPM

## 2019-05-09 DIAGNOSIS — I10 HYPERTENSION, UNSPECIFIED TYPE: Primary | ICD-10-CM

## 2019-05-09 DIAGNOSIS — E11.9 TYPE 2 DIABETES MELLITUS WITHOUT COMPLICATION, WITHOUT LONG-TERM CURRENT USE OF INSULIN (HCC): ICD-10-CM

## 2019-05-09 DIAGNOSIS — F41.9 ANXIETY: ICD-10-CM

## 2019-05-09 PROCEDURE — 2022F DILAT RTA XM EVC RTNOPTHY: CPT | Performed by: NURSE PRACTITIONER

## 2019-05-09 PROCEDURE — G8427 DOCREV CUR MEDS BY ELIG CLIN: HCPCS | Performed by: NURSE PRACTITIONER

## 2019-05-09 PROCEDURE — 99204 OFFICE O/P NEW MOD 45 MIN: CPT | Performed by: NURSE PRACTITIONER

## 2019-05-09 PROCEDURE — 3046F HEMOGLOBIN A1C LEVEL >9.0%: CPT | Performed by: NURSE PRACTITIONER

## 2019-05-09 PROCEDURE — G8417 CALC BMI ABV UP PARAM F/U: HCPCS | Performed by: NURSE PRACTITIONER

## 2019-05-09 PROCEDURE — 4004F PT TOBACCO SCREEN RCVD TLK: CPT | Performed by: NURSE PRACTITIONER

## 2019-05-09 RX ORDER — LATANOPROST 50 UG/ML
SOLUTION/ DROPS OPHTHALMIC
Refills: 0 | COMMUNITY
Start: 2019-05-05 | End: 2019-05-31 | Stop reason: SDUPTHER

## 2019-05-09 RX ORDER — TIMOLOL MALEATE 5 MG/ML
SOLUTION/ DROPS OPHTHALMIC
Refills: 0 | COMMUNITY
Start: 2019-02-11 | End: 2019-06-07

## 2019-05-09 RX ORDER — RANITIDINE 150 MG/1
150 TABLET ORAL
COMMUNITY
End: 2019-05-14

## 2019-05-09 RX ORDER — BLOOD-GLUCOSE METER
KIT MISCELLANEOUS
Refills: 0 | COMMUNITY
Start: 2019-04-14

## 2019-05-09 RX ORDER — BLOOD-GLUCOSE METER
KIT MISCELLANEOUS
Refills: 0 | COMMUNITY
Start: 2019-05-02 | End: 2019-05-31 | Stop reason: SDUPTHER

## 2019-05-09 RX ORDER — QUINAPRIL 5 1/1
5 TABLET ORAL DAILY
Qty: 30 TABLET | Refills: 2 | Status: SHIPPED | OUTPATIENT
Start: 2019-05-09 | End: 2020-10-02 | Stop reason: ALTCHOICE

## 2019-05-09 RX ORDER — HYDROXYZINE 50 MG/1
50 TABLET, FILM COATED ORAL EVERY 8 HOURS PRN
Qty: 90 TABLET | Refills: 1 | Status: SHIPPED | OUTPATIENT
Start: 2019-05-09 | End: 2020-10-02 | Stop reason: ALTCHOICE

## 2019-05-09 ASSESSMENT — PATIENT HEALTH QUESTIONNAIRE - PHQ9
1. LITTLE INTEREST OR PLEASURE IN DOING THINGS: 1
2. FEELING DOWN, DEPRESSED OR HOPELESS: 0
SUM OF ALL RESPONSES TO PHQ9 QUESTIONS 1 & 2: 1
SUM OF ALL RESPONSES TO PHQ QUESTIONS 1-9: 1
SUM OF ALL RESPONSES TO PHQ QUESTIONS 1-9: 1

## 2019-05-14 ENCOUNTER — APPOINTMENT (OUTPATIENT)
Dept: GENERAL RADIOLOGY | Age: 34
End: 2019-05-14
Payer: COMMERCIAL

## 2019-05-14 ENCOUNTER — HOSPITAL ENCOUNTER (EMERGENCY)
Age: 34
Discharge: HOME | End: 2019-05-14
Attending: EMERGENCY MEDICINE
Payer: COMMERCIAL

## 2019-05-14 VITALS
DIASTOLIC BLOOD PRESSURE: 84 MMHG | SYSTOLIC BLOOD PRESSURE: 146 MMHG | WEIGHT: 275 LBS | OXYGEN SATURATION: 96 % | BODY MASS INDEX: 39.37 KG/M2 | HEIGHT: 70 IN | TEMPERATURE: 98.4 F | HEART RATE: 82 BPM | RESPIRATION RATE: 16 BRPM

## 2019-05-14 DIAGNOSIS — S92.412A CLOSED DISPLACED FRACTURE OF PROXIMAL PHALANX OF LEFT GREAT TOE, INITIAL ENCOUNTER: Primary | ICD-10-CM

## 2019-05-14 PROCEDURE — 73660 X-RAY EXAM OF TOE(S): CPT

## 2019-05-14 PROCEDURE — 99283 EMERGENCY DEPT VISIT LOW MDM: CPT

## 2019-05-14 RX ORDER — NAPROXEN 500 MG/1
500 TABLET ORAL 2 TIMES DAILY
Qty: 20 TABLET | Refills: 0 | Status: SHIPPED | OUTPATIENT
Start: 2019-05-14 | End: 2020-03-20

## 2019-05-14 ASSESSMENT — PAIN DESCRIPTION - ORIENTATION
ORIENTATION: LEFT
ORIENTATION: LEFT

## 2019-05-14 ASSESSMENT — PAIN SCALES - GENERAL
PAINLEVEL_OUTOF10: 2
PAINLEVEL_OUTOF10: 9

## 2019-05-14 ASSESSMENT — PAIN DESCRIPTION - ONSET: ONSET: SUDDEN

## 2019-05-14 ASSESSMENT — PAIN DESCRIPTION - PROGRESSION: CLINICAL_PROGRESSION: NOT CHANGED

## 2019-05-14 ASSESSMENT — PAIN DESCRIPTION - FREQUENCY: FREQUENCY: CONTINUOUS

## 2019-05-14 ASSESSMENT — ENCOUNTER SYMPTOMS: COLOR CHANGE: 0

## 2019-05-14 ASSESSMENT — PAIN DESCRIPTION - LOCATION
LOCATION: TOE (COMMENT WHICH ONE)
LOCATION: TOE (COMMENT WHICH ONE)

## 2019-05-14 ASSESSMENT — PAIN DESCRIPTION - DESCRIPTORS
DESCRIPTORS: THROBBING
DESCRIPTORS: ACHING

## 2019-05-14 ASSESSMENT — PAIN DESCRIPTION - PAIN TYPE: TYPE: ACUTE PAIN

## 2019-05-15 NOTE — ED PROVIDER NOTES
was  minutes, excluding separately reportableprocedures. There was a high probability of clinicallysignificant/life threatening deterioration in the patient's condition which required my urgent intervention. CONSULTS:  None    PROCEDURES:  Unless otherwise noted below, none     Procedures    FINAL IMPRESSION      1.  Closed displaced fracture of proximal phalanx of left great toe, initial encounter        DISPOSITION/PLAN   DISPOSITION Decision To Discharge 05/14/2019 09:25:49 PM      PATIENT REFERRED TO:  Andressa Cummings DPM  [address], [address]  [place] [id number] [phone]      As needed      DISCHARGE MEDICATIONS:  New Prescriptions    NAPROXEN (NAPROSYN) 500 MG TABLET    Take 1 tablet by mouth 2 times daily          (Please note that portions of this note were completed with a voice recognitionprogram.  Efforts were made to edit the dictations but occasionally words are mis-transcribed.)    Amos Rosas MD (electronically signed)  Attending Emergency Physician        Mary Benitez MD  05/14/19 1712

## 2019-05-20 PROBLEM — F41.9 ANXIETY: Status: ACTIVE | Noted: 2019-05-20

## 2019-05-20 PROBLEM — E11.9 TYPE 2 DIABETES MELLITUS WITHOUT COMPLICATION, WITHOUT LONG-TERM CURRENT USE OF INSULIN (HCC): Status: ACTIVE | Noted: 2019-05-20

## 2019-05-20 ASSESSMENT — ENCOUNTER SYMPTOMS
CHEST TIGHTNESS: 0
SHORTNESS OF BREATH: 0
ABDOMINAL PAIN: 0

## 2019-05-21 NOTE — PROGRESS NOTES
nervous/anxious. Prior to Visit Medications    Medication Sig Taking? Authorizing Provider   quinapril (ACCUPRIL) 5 MG tablet Take 1 tablet by mouth daily Yes JOSSELINE Mueller CNP   Blood Pressure Monitoring (BLOOD PRESSURE KIT) NICHOLAS Dispense 1 machine with large cuff, diagnosis hypertension and diabetes. Patient is to take blood pressure 2x a day and record.  Yes JOSSELINE Mueller CNP   hydrOXYzine (ATARAX) 50 MG tablet Take 1 tablet by mouth every 8 hours as needed for Anxiety Yes JOSSELINE Mueller CNP   VORTIoxetine (TRINTELLIX) 5 MG tablet Take 1 tablet by mouth daily Yes JOSSELINE Mueller CNP   glipiZIDE (GLUCOTROL) 5 MG tablet Take 1 tablet by mouth 2 times daily (before meals) Yes Naveen Angulo MD   metFORMIN (GLUCOPHAGE-XR) 500 MG extended release tablet Take 1 tablet by mouth 2 times daily Yes Naveen Angulo MD   metoprolol tartrate (LOPRESSOR) 50 MG tablet Take 1 tablet by mouth 2 times daily Yes Naveen Angulo MD   naproxen (NAPROSYN) 500 MG tablet Take 1 tablet by mouth 2 times daily  Angella Dodd MD   Blood Glucose Monitoring Suppl (FREESTYLE LITE) NICHOLAS U UTD TO TEST 5-6 TIMES D  Historical Provider, MD   FREESTYLE LITE strip U UTD TO TEST BLOOD SUGAR 7 TIMES A DAY  Historical Provider, MD   latanoprost (XALATAN) 0.005 % ophthalmic solution   Historical Provider, MD   timolol (TIMOPTIC) 0.5 % ophthalmic solution instill 1 drop into both eyes twice a day  Historical Provider, MD        Allergies   Allergen Reactions    Buspar [Buspirone] Anaphylaxis    Clindamycin/Lincomycin     Tramadol Nausea Only    Ketorolac Nausea And Vomiting     vomiting       Past Medical History:   Diagnosis Date    Diabetes mellitus ([facility] [address]     Hypertension        Past Surgical History:   Procedure Laterality Date    HERNIA REPAIR      MOUTH SURGERY      TONSILLECTOMY AND ADENOIDECTOMY         Social History     Socioeconomic History    Marital status: Single     Spouse name: Not on file    Number of children: Not on file    Years of education: Not on file    Highest education level: Not on file   Occupational History    Not on file   Social Needs    Financial resource strain: Not on file    Food insecurity:     Worry: Not on file     Inability: Not on file    Transportation needs:     Medical: Not on file     Non-medical: Not on file   Tobacco Use    Smoking status: Current Every Day Smoker     Packs/day: 0.50     Types: Cigarettes    Smokeless tobacco: Never Used    Tobacco comment: trying to quit   Substance and Sexual Activity    Alcohol use: No    Drug use: No    Sexual activity: Yes     Partners: Female   Lifestyle    Physical activity:     Days per week: Not on file     Minutes per session: Not on file    Stress: Not on file   Relationships    Social connections:     Talks on phone: Not on file     Gets together: Not on file     Attends Mosque service: Not on file     Active member of club or organization: Not on file     Attends meetings of clubs or organizations: Not on file     Relationship status: Not on file    Intimate partner violence:     Fear of current or ex partner: Not on file     Emotionally abused: Not on file     Physically abused: Not on file     Forced sexual activity: Not on file   Other Topics Concern    Not on file   Social History Narrative    Not on file        History reviewed. No pertinent family history. Vitals:    05/09/19 1558 05/09/19 1608   BP: (!) 142/82 (!) 142/82   Site: Right Upper Arm Left Upper Arm   Position: Sitting Sitting   Cuff Size: Medium Adult Large Adult   Pulse: 85    Resp: 14    Temp: 96.8 °F (36 °C)    TempSrc: Temporal    SpO2: 97%    Weight: 298 lb (135.2 kg)    Height: 5' 10\" (1.778 m)      Estimated body mass index is 42.76 kg/m² as calculated from the following:    Height as of this encounter: 5' 10\" (1.778 m).     Weight as of this encounter: 298 lb Dispense: 1 Device; Refill: 0    3. Anxiety  - Comprehensive Metabolic Panel; Future  - CBC Auto Differential; Future  - Lipid Panel; Future  - TSH with Reflex; Future  - Hemoglobin A1C; Future  - hydrOXYzine (ATARAX) 50 MG tablet; Take 1 tablet by mouth every 8 hours as needed for Anxiety  Dispense: 90 tablet; Refill: 1  - VORTIoxetine (TRINTELLIX) 5 MG tablet; Take 1 tablet by mouth daily  Dispense: 30 tablet; Refill: 0    Patient was told that if he no showed another appointment he would be discharged from the practice. The patient verbalized understanding. The patient was given order for blood pressure monitor. The patient was started on Trintellix. The patient was given Atarax PRN for panic. Blood work was ordered for baseline labs. Return in about 1 month (around 6/9/2019) for 30 min follow up. HAS TO BE 30. An  electronic signature was used to authenticate this note.     --JOSSELINE Siegel - CNP on 5/20/2019 at 10:21 PM

## 2019-05-28 ENCOUNTER — APPOINTMENT (OUTPATIENT)
Dept: GENERAL RADIOLOGY | Age: 34
End: 2019-05-28
Payer: COMMERCIAL

## 2019-05-28 ENCOUNTER — HOSPITAL ENCOUNTER (EMERGENCY)
Age: 34
Discharge: HOME | End: 2019-05-28
Attending: EMERGENCY MEDICINE
Payer: COMMERCIAL

## 2019-05-28 VITALS
SYSTOLIC BLOOD PRESSURE: 143 MMHG | RESPIRATION RATE: 16 BRPM | TEMPERATURE: 98.4 F | BODY MASS INDEX: 39.37 KG/M2 | HEART RATE: 79 BPM | HEIGHT: 70 IN | DIASTOLIC BLOOD PRESSURE: 90 MMHG | WEIGHT: 275 LBS | OXYGEN SATURATION: 97 %

## 2019-05-28 DIAGNOSIS — S92.415D CLOSED NONDISPLACED FRACTURE OF PROXIMAL PHALANX OF LEFT GREAT TOE WITH ROUTINE HEALING, SUBSEQUENT ENCOUNTER: Primary | ICD-10-CM

## 2019-05-28 DIAGNOSIS — F41.1 ANXIETY STATE: ICD-10-CM

## 2019-05-28 DIAGNOSIS — Z76.5 DRUG-SEEKING BEHAVIOR: ICD-10-CM

## 2019-05-28 PROCEDURE — 99283 EMERGENCY DEPT VISIT LOW MDM: CPT

## 2019-05-28 PROCEDURE — 73660 X-RAY EXAM OF TOE(S): CPT

## 2019-05-28 RX ORDER — METHYLPREDNISOLONE SODIUM SUCCINATE 125 MG/2ML
60 INJECTION, POWDER, LYOPHILIZED, FOR SOLUTION INTRAMUSCULAR; INTRAVENOUS ONCE
Status: DISCONTINUED | OUTPATIENT
Start: 2019-05-28 | End: 2019-05-28

## 2019-05-28 RX ORDER — ONDANSETRON 2 MG/ML
4 INJECTION INTRAMUSCULAR; INTRAVENOUS ONCE
Status: DISCONTINUED | OUTPATIENT
Start: 2019-05-28 | End: 2019-05-28

## 2019-05-28 ASSESSMENT — PAIN DESCRIPTION - DESCRIPTORS: DESCRIPTORS: CONSTANT;STABBING

## 2019-05-28 ASSESSMENT — PAIN DESCRIPTION - LOCATION: LOCATION: TOE (COMMENT WHICH ONE)

## 2019-05-28 ASSESSMENT — PAIN SCALES - GENERAL: PAINLEVEL_OUTOF10: 8

## 2019-05-28 ASSESSMENT — PAIN DESCRIPTION - PROGRESSION: CLINICAL_PROGRESSION: GRADUALLY WORSENING

## 2019-05-28 ASSESSMENT — PAIN DESCRIPTION - FREQUENCY: FREQUENCY: CONTINUOUS

## 2019-05-28 ASSESSMENT — PAIN DESCRIPTION - ONSET: ONSET: SUDDEN

## 2019-05-29 NOTE — ED TRIAGE NOTES
Left great toe pain, previous injury. Previous break to same toe, arrives wearing boot. Took norco and naproxen at home, denies pain medication. Patient states \"I just want to know if its broken\". A/0 x3, ambulatory, resps even and unlabored on room air. Call bell in reach.

## 2019-05-29 NOTE — ED NOTES
Explained discharge instructions to patient. Went over discharge diagnosis and pertinent educational material with patient. Patient stated understanding of discharge diagnosis, instructions, and home symptom management. Ice pack provided for home use. Patient denies any questions at this time, all concerns addressed. No signs or symptoms of pain or distress noted at this time. Patient discharged to home. A/0 x3, ambulatory, resps even and unlabored on room air. Follow up instructions and reasons to return to ER reviewed.       Deedee Hightower RN  05/28/19 0099

## 2019-05-29 NOTE — ED PROVIDER NOTES
[facility] ED  eMERGENCY dEPARTMENT eNCOUnter      Pt Name: Abilio Phillips  MRN: [id number]  [place] 1985  Date of evaluation: 5/28/2019  Provider: Jarod Godwin MD    CHIEF COMPLAINT       Chief Complaint   Patient presents with    Toe Pain     Pt fx L. big toe 3 weeks ago. Pt kicked a bed post and thinks he refractured it. Pt c/o of increased pain. (Pt is wearing a walkng boot)         HISTORY OF PRESENT ILLNESS   (Location/Symptom, Timing/Onset,Context/Setting, Quality, Duration, Modifying Factors, Severity)  Note limiting factors. Abilio Phillips is a 37 y.o. male who presents to the emergency department  fracture is proximal left great toe seen by an orthopedist, he is out of his Percocet. He thinks that he may have broken it again, he kicked a hard object is complaining of recurrent toe pain on the left great area. He has been extremely threatening and disruptive to ER staff in the past.  He has also been caught vandalizing the bathrooms anytime he does not get narcotics. Charges have not been pressed against him. HPI    NursingNotes were reviewed. REVIEW OF SYSTEMS    (2-9 systems for level 4, 10 or more for level 5)     Review of Systems     Constitutional symptoms:  no Fatigue, no fever, no chills. Skin symptoms:  Negative except as documented in HPI. ENMT symptoms:  Negative except as documented in HPI. Respiratory symptoms:  Negative except as documented in HPI. Cardiovascular symptoms:  Negative except as documented in HPI. Gastrointestinal symptoms: Negative except for documented as above in the HPI   Genitourinary symptoms:  Negative except as documented in HPI. Musculoskeletal symptoms:  Negative except as documented in HPI. Left great toe as above  Neurologic symptoms:  Negative except as documented in HPI. Remainder of 10 systems, all negative except for mentioned above      Except as noted above the remainder of the review of systems was reviewed and negative. PAST MEDICAL HISTORY     Past Medical History:   Diagnosis Date    Diabetes mellitus ([facility] [address]     Hypertension          SURGICALHISTORY       Past Surgical History:   Procedure Laterality Date    HERNIA REPAIR      MOUTH SURGERY      TONSILLECTOMY AND ADENOIDECTOMY           CURRENT MEDICATIONS       Previous Medications    BLOOD GLUCOSE MONITORING SUPPL (FREESTYLE LITE) NICHOLAS    U UTD TO TEST 5-6 TIMES D    BLOOD PRESSURE MONITORING (BLOOD PRESSURE KIT) NICHOLAS    Dispense 1 machine with large cuff, diagnosis hypertension and diabetes. Patient is to take blood pressure 2x a day and record. FREESTYLE LITE STRIP    U UTD TO TEST BLOOD SUGAR 7 TIMES A DAY    GLIPIZIDE (GLUCOTROL) 5 MG TABLET    Take 1 tablet by mouth 2 times daily (before meals)    HYDROXYZINE (ATARAX) 50 MG TABLET    Take 1 tablet by mouth every 8 hours as needed for Anxiety    LATANOPROST (XALATAN) 0.005 % OPHTHALMIC SOLUTION        METFORMIN (GLUCOPHAGE-XR) 500 MG EXTENDED RELEASE TABLET    Take 1 tablet by mouth 2 times daily    METOPROLOL TARTRATE (LOPRESSOR) 50 MG TABLET    Take 1 tablet by mouth 2 times daily    NAPROXEN (NAPROSYN) 500 MG TABLET    Take 1 tablet by mouth 2 times daily    QUINAPRIL (ACCUPRIL) 5 MG TABLET    Take 1 tablet by mouth daily    TIMOLOL (TIMOPTIC) 0.5 % OPHTHALMIC SOLUTION    instill 1 drop into both eyes twice a day    VORTIOXETINE (TRINTELLIX) 5 MG TABLET    Take 1 tablet by mouth daily       ALLERGIES     Buspar [buspirone]; Clindamycin/lincomycin; Tramadol; and Ketorolac    FAMILY HISTORY     History reviewed. No pertinent family history.        SOCIAL HISTORY       Social History     Socioeconomic History    Marital status: Single     Spouse name: None    Number of children: None    Years of education: None    Highest education level: None   Occupational History    None   Social Needs    Financial resource strain: None    Food insecurity:     Worry: None     Inability: None    Transportation 1. Closed nondisplaced fracture of proximal phalanx of left great toe with routine healing, subsequent encounter    2. Drug-seeking behavior    3.  Anxiety state          DISPOSITION/PLAN   DISPOSITION Decision To Discharge 05/28/2019 10:24:44 PM      PATIENT REFERRED TO:  Follow-up with orthopedic surgeon as required            DISCHARGE MEDICATIONS:  New Prescriptions    No medications on file          (Please note that portions of this note were completed with a voice recognition program.  Efforts were made to edit the dictations but occasionally words are mis-transcribed.)    Katja Quijano MD (electronically signed)  Attending Emergency Physician          Katja Quijano MD  05/28/19 7694       Katja Quijano MD  05/28/19 0626

## 2019-05-31 ENCOUNTER — TELEPHONE (OUTPATIENT)
Dept: FAMILY MEDICINE CLINIC | Age: 34
End: 2019-05-31

## 2019-05-31 RX ORDER — METOPROLOL TARTRATE 50 MG/1
50 TABLET, FILM COATED ORAL 2 TIMES DAILY
Qty: 60 TABLET | Refills: 0 | Status: SHIPPED | OUTPATIENT
Start: 2019-05-31

## 2019-05-31 RX ORDER — BLOOD-GLUCOSE METER
1 KIT MISCELLANEOUS 3 TIMES DAILY
Qty: 500 EACH | Refills: 5 | Status: SHIPPED | OUTPATIENT
Start: 2019-05-31

## 2019-05-31 RX ORDER — GLIPIZIDE 5 MG/1
5 TABLET ORAL
Qty: 60 TABLET | Refills: 0 | Status: SHIPPED | OUTPATIENT
Start: 2019-05-31

## 2019-05-31 RX ORDER — LATANOPROST 50 UG/ML
1 SOLUTION/ DROPS OPHTHALMIC NIGHTLY
Qty: 1 BOTTLE | Refills: 0 | Status: SHIPPED | OUTPATIENT
Start: 2019-05-31 | End: 2020-11-02 | Stop reason: ALTCHOICE

## 2019-05-31 RX ORDER — METFORMIN HYDROCHLORIDE 500 MG/1
500 TABLET, EXTENDED RELEASE ORAL 2 TIMES DAILY
Qty: 60 TABLET | Refills: 0 | Status: SHIPPED | OUTPATIENT
Start: 2019-05-31

## 2019-05-31 NOTE — TELEPHONE ENCOUNTER
Patient requesting medication refill. Please approve or deny this request.    Rx requested:  Requested Prescriptions     Pending Prescriptions Disp Refills    glipiZIDE (GLUCOTROL) 5 MG tablet 60 tablet 0     Sig: Take 1 tablet by mouth 2 times daily (before meals)    metFORMIN (GLUCOPHAGE-XR) 500 MG extended release tablet 60 tablet 0     Sig: Take 1 tablet by mouth 2 times daily    metoprolol tartrate (LOPRESSOR) 50 MG tablet 60 tablet 0     Sig: Take 1 tablet by mouth 2 times daily    latanoprost (XALATAN) 0.005 % ophthalmic solution 1 Bottle 0     Sig: Place 1 drop into both eyes nightly    FREESTYLE LITE strip 500 each 0     Sig: As needed.          Last Office Visit:   5/9/2019      Next Visit Date:  Future Appointments   Date Time Provider Nelson Lechuga   6/4/2019  4:00 PM Nishant Almonte, [address] [facility] [address]

## 2019-06-07 ENCOUNTER — HOSPITAL ENCOUNTER (EMERGENCY)
Age: 34
Discharge: HOME | End: 2019-06-07
Attending: EMERGENCY MEDICINE
Payer: COMMERCIAL

## 2019-06-07 VITALS
WEIGHT: 275 LBS | DIASTOLIC BLOOD PRESSURE: 79 MMHG | BODY MASS INDEX: 39.37 KG/M2 | SYSTOLIC BLOOD PRESSURE: 142 MMHG | RESPIRATION RATE: 17 BRPM | HEIGHT: 70 IN | OXYGEN SATURATION: 95 % | TEMPERATURE: 98 F | HEART RATE: 91 BPM

## 2019-06-07 DIAGNOSIS — Z46.89 ENCOUNTER FOR CAST REMOVAL: Primary | ICD-10-CM

## 2019-06-07 PROCEDURE — 99282 EMERGENCY DEPT VISIT SF MDM: CPT

## 2019-06-07 ASSESSMENT — ENCOUNTER SYMPTOMS
SHORTNESS OF BREATH: 0
ABDOMINAL DISTENTION: 0
VOMITING: 0
NAUSEA: 0
COUGH: 0
PHOTOPHOBIA: 0
SINUS PRESSURE: 0
APNEA: 0
SORE THROAT: 0
ABDOMINAL PAIN: 0
COLOR CHANGE: 0
BACK PAIN: 0
EYE PAIN: 0
WHEEZING: 0
RHINORRHEA: 0
CONSTIPATION: 0
DIARRHEA: 0

## 2019-06-08 NOTE — ED PROVIDER NOTES
[address] [place] [address] ED  eMERGENCY dEPARTMENT eNCOUnter      Pt Name: Jade Figueroa  MRN: [id number]  [place] 1985  Date of evaluation: 6/7/2019  Provider: Faith Lemon MD    [address] [place] [address]       Chief Complaint   Patient presents with    Cast Removal         HISTORY OF PRESENT ILLNESS   (Location/Symptom, Timing/Onset,Context/Setting, Quality, Duration, Modifying Factors, Severity)  Note limiting factors. Jade Figueroa is a 37 y.o. male who presents to the emergency department with complaint of irritation on the left foot cast.  Cast was placed about one week ago because of great toe fracture. Cast was scheduled to be taken out coming Monday. Patient accidentally got dirt and louis inside causing major irritation and discomfort. He is here for the cast to be removed. Denies any other systemic symptoms. HPI    Nursing Notes were reviewed. REVIEW OF SYSTEMS    (2-9 systems for level 4, 10 or more for level 5)     Review of Systems   Constitutional: Negative. Negative for activity change, appetite change, chills, fatigue and fever. HENT: Negative for congestion, ear discharge, ear pain, hearing loss, rhinorrhea, sinus pressure and sore throat. Eyes: Negative for photophobia, pain and visual disturbance. Respiratory: Negative for apnea, cough, shortness of breath and wheezing. Cardiovascular: Negative for chest pain, palpitations and leg swelling. Gastrointestinal: Negative for abdominal distention, abdominal pain, constipation, diarrhea, nausea and vomiting. Endocrine: Negative for cold intolerance, heat intolerance and polyuria. Genitourinary: Negative for dysuria, flank pain, frequency and urgency. Musculoskeletal: Negative for arthralgias, back pain, gait problem, myalgias and neck stiffness. Skin: Negative for color change, pallor and rash. Allergic/Immunologic: Negative for food allergies and immunocompromised state.    Neurological: Negative for dizziness, tremors, syncope, weakness, light-headedness and headaches. Psychiatric/Behavioral: Negative for agitation, confusion and hallucinations. All other systems reviewed and are negative. Except as noted above the remainder of the review of systems was reviewed and negative. PAST MEDICAL HISTORY     Past Medical History:   Diagnosis Date    Diabetes mellitus ([facility] [address]     Hypertension          SURGICAL HISTORY       Past Surgical History:   Procedure Laterality Date    HERNIA REPAIR      MOUTH SURGERY      TONSILLECTOMY AND ADENOIDECTOMY           CURRENT MEDICATIONS       Discharge Medication List as of 6/7/2019 11:39 PM      CONTINUE these medications which have NOT CHANGED    Details   glipiZIDE (GLUCOTROL) 5 MG tablet Take 1 tablet by mouth 2 times daily (before meals), Disp-60 tablet, R-0Normal      metFORMIN (GLUCOPHAGE-XR) 500 MG extended release tablet Take 1 tablet by mouth 2 times daily, Disp-60 tablet, R-0Normal      metoprolol tartrate (LOPRESSOR) 50 MG tablet Take 1 tablet by mouth 2 times daily, Disp-60 tablet, R-0Normal      latanoprost (XALATAN) 0.005 % ophthalmic solution Place 1 drop into both eyes nightly, Disp-1 Bottle, R-0Normal      naproxen (NAPROSYN) 500 MG tablet Take 1 tablet by mouth 2 times daily, Disp-20 tablet, R-0Print      quinapril (ACCUPRIL) 5 MG tablet Take 1 tablet by mouth daily, Disp-30 tablet, R-2Normal      hydrOXYzine (ATARAX) 50 MG tablet Take 1 tablet by mouth every 8 hours as needed for Anxiety, Disp-90 tablet, R-1Normal      VORTIoxetine (TRINTELLIX) 5 MG tablet Take 1 tablet by mouth daily, Disp-30 tablet, R-0Normal      FREESTYLE LITE strip 1 each by Does not apply route 3 times daily, Disp-500 each, R-5, DAWNormal      Blood Glucose Monitoring Suppl (FREESTYLE LITE) NICHOLAS R-0, Historical Med      Blood Pressure Monitoring (BLOOD PRESSURE KIT) NICHOLAS Dispense 1 machine with large cuff, diagnosis hypertension and diabetes.     Patient is to take blood pressure 2x a day and record., Disp-1 Device, R-0Print             ALLERGIES         FAMILY HISTORY     History reviewed. No pertinent family history. SOCIAL HISTORY       Social History     Socioeconomic History    Marital status: Single     Spouse name: None    Number of children: None    Years of education: None    Highest education level: None   Occupational History    None   Social Needs    Financial resource strain: None    Food insecurity:     Worry: None     Inability: None    Transportation needs:     Medical: None     Non-medical: None   Tobacco Use    Smoking status: Current Every Day Smoker     Packs/day: 0.50     Types: Cigarettes    Smokeless tobacco: Never Used    Tobacco comment: trying to quit   Substance and Sexual Activity    Alcohol use: No    Drug use: No    Sexual activity: Yes     Partners: Female   Lifestyle    Physical activity:     Days per week: None     Minutes per session: None    Stress: None   Relationships    Social connections:     Talks on phone: None     Gets together: None     Attends Oriental orthodox service: None     Active member of club or organization: None     Attends meetings of clubs or organizations: None     Relationship status: None    Intimate partner violence:     Fear of current or ex partner: None     Emotionally abused: None     Physically abused: None     Forced sexual activity: None   Other Topics Concern    None   Social History Narrative    None       SCREENINGS             PHYSICAL EXAM    (up to 7 for level 4, 8 or more for level 5)     ED Triage Vitals [06/07/19 2256]   BP Temp Temp Source Pulse Resp SpO2 Height Weight   (!) 155/83 98 °F (36.7 °C) Oral 101 16 95 % 5' 10\" (1.778 m) 275 lb (124.7 kg)       Physical Exam   Constitutional: He is oriented to person, place, and time. He appears well-developed and well-nourished. No distress. HENT:   Head: Normocephalic and atraumatic.    Nose: Nose normal.   Mouth/Throat: Oropharynx is clear and moist. No oropharyngeal exudate. Eyes: Pupils are equal, round, and reactive to light. Conjunctivae and EOM are normal. Right eye exhibits no discharge. Left eye exhibits no discharge. No scleral icterus. Neck: Normal range of motion. Neck supple. No JVD present. No tracheal deviation present. No thyromegaly present. Cardiovascular: Normal rate, regular rhythm, normal heart sounds and intact distal pulses. Exam reveals no gallop and no friction rub. No murmur heard. Pulmonary/Chest: Effort normal and breath sounds normal. No stridor. No respiratory distress. He has no wheezes. He has no rales. He exhibits no tenderness. Abdominal: Soft. Bowel sounds are normal. He exhibits no distension and no mass. There is no tenderness. There is no rebound and no guarding. Musculoskeletal: Normal range of motion. He exhibits no edema, tenderness or deformity. Left foot cast intact. No neurovascular compromise noted. Lymphadenopathy:     He has no cervical adenopathy. Neurological: He is alert and oriented to person, place, and time. He has normal reflexes. He displays normal reflexes. No cranial nerve deficit. He exhibits normal muscle tone. Coordination normal.   Skin: Skin is warm and dry. No rash noted. He is not diaphoretic. No erythema. No pallor. Psychiatric: He has a normal mood and affect. His behavior is normal. Judgment and thought content normal.   Nursing note and vitals reviewed.       DIAGNOSTIC RESULTS     EKG: All EKG's are interpreted by the Emergency Department Physician who either signs or Co-signs this chart in the absence of a cardiologist.        RADIOLOGY:   Non-plain film images such as CT, Ultrasound and MRI are read by the radiologist. [facility] radiographicimages are visualized and preliminarily interpreted by the emergency physician with the below findings:        Interpretation per the Radiologist below, if available at the time of this note:    No orders to display         ED BEDSIDE ULTRASOUND:   Performed by ED Physician - none    LABS:  Labs Reviewed - No data to display    All other labs were within normal range or not returned as of this dictation. EMERGENCY DEPARTMENT COURSE and DIFFERENTIALDIAGNOSIS/MDM:    Cast was removed uneventfully using cast cutter . Patient tolerated the procedure well. He is advised to follow-up with his orthopedist on Monday as already scheduled. Vitals:    Vitals:    06/07/19 2256 06/07/19 2343   BP: (!) 155/83 (!) 142/79   Pulse: 101 91   Resp: 16 17   Temp: 98 °F (36.7 °C)    TempSrc: Oral    SpO2: 95% 95%   Weight: 275 lb (124.7 kg)    Height: 5' 10\" (1.778 m)            MDM  Number of Diagnoses or Management Options  Encounter for cast removal:   Risk of Complications, Morbidity, and/or Mortality  Presenting problems: moderate  Diagnostic procedures: minimal  Management options: moderate    Patient Progress  Patient progress: improved      CRITICAL CARE TIME   Total Critical Care time was  minutes, excluding separately reportable procedures. There was a high probability of clinically significant/life threatening deterioration in the patient's condition which required my urgentintervention. CONSULTS:  None    PROCEDURES:  Unless otherwise noted below, none     Procedures    FINAL IMPRESSION      1.  Encounter for cast removal          DISPOSITION/PLAN   DISPOSITION Decision To Discharge 06/07/2019 11:36:18 PM      PATIENT REFERRED TO:  JOSSELINE Vidal - [address] [place] [address] [phone]    In 3 days      Follow up with your orthopedist as scheduled    In 3 days        DISCHARGE MEDICATIONS:  Discharge Medication List as of 6/7/2019 11:39 PM             (Please note that portions of this note were completed with a voice recognitionprogram.  Efforts were made to edit the dictations but occasionally words are mis-transcribed.)    Alfredito Cavanaugh MD (electronically signed)  Attending Emergency Physician          Iliana Izaguirre MD  06/07/19 8814

## 2019-06-08 NOTE — ED TRIAGE NOTES
Pt wants his cast on left foot removed because he got dirt and stones into the top of the cast and is irritating his foot

## 2019-06-10 ENCOUNTER — TELEPHONE (OUTPATIENT)
Dept: FAMILY MEDICINE CLINIC | Age: 34
End: 2019-06-10

## 2019-06-10 NOTE — TELEPHONE ENCOUNTER
Giulia Prince calling to notify you of another ER visit. [facility]. She can be contacted if any questions or concerns.

## 2019-08-18 RX ORDER — METOPROLOL TARTRATE 50 MG/1
TABLET, FILM COATED ORAL
Qty: 60 TABLET | Refills: 0 | OUTPATIENT
Start: 2019-08-18

## 2019-09-12 RX ORDER — METFORMIN HYDROCHLORIDE 500 MG/1
TABLET, EXTENDED RELEASE ORAL
Qty: 60 TABLET | Refills: 0 | OUTPATIENT
Start: 2019-09-12

## 2020-03-20 ENCOUNTER — HOSPITAL ENCOUNTER (EMERGENCY)
Age: 35
Discharge: HOME | End: 2020-03-20
Attending: INTERNAL MEDICINE
Payer: MEDICARE

## 2020-03-20 ENCOUNTER — APPOINTMENT (OUTPATIENT)
Dept: GENERAL RADIOLOGY | Age: 35
End: 2020-03-20
Payer: MEDICARE

## 2020-03-20 ENCOUNTER — APPOINTMENT (OUTPATIENT)
Dept: CT IMAGING | Age: 35
End: 2020-03-20
Payer: MEDICARE

## 2020-03-20 VITALS
SYSTOLIC BLOOD PRESSURE: 158 MMHG | HEIGHT: 70 IN | WEIGHT: 268 LBS | DIASTOLIC BLOOD PRESSURE: 89 MMHG | TEMPERATURE: 98 F | HEART RATE: 82 BPM | OXYGEN SATURATION: 96 % | RESPIRATION RATE: 20 BRPM | BODY MASS INDEX: 38.37 KG/M2

## 2020-03-20 PROCEDURE — 6370000000 HC RX 637 (ALT 250 FOR IP): Performed by: INTERNAL MEDICINE

## 2020-03-20 PROCEDURE — 71045 X-RAY EXAM CHEST 1 VIEW: CPT

## 2020-03-20 PROCEDURE — 72040 X-RAY EXAM NECK SPINE 2-3 VW: CPT

## 2020-03-20 PROCEDURE — 99284 EMERGENCY DEPT VISIT MOD MDM: CPT

## 2020-03-20 PROCEDURE — 73030 X-RAY EXAM OF SHOULDER: CPT

## 2020-03-20 PROCEDURE — 70450 CT HEAD/BRAIN W/O DYE: CPT

## 2020-03-20 RX ORDER — ACETAMINOPHEN 325 MG/1
650 TABLET ORAL ONCE
Status: COMPLETED | OUTPATIENT
Start: 2020-03-20 | End: 2020-03-20

## 2020-03-20 RX ORDER — HYDROCODONE BITARTRATE AND ACETAMINOPHEN 5; 325 MG/1; MG/1
1 TABLET ORAL EVERY 6 HOURS PRN
Qty: 10 TABLET | Refills: 0 | Status: SHIPPED | OUTPATIENT
Start: 2020-03-20 | End: 2020-03-23

## 2020-03-20 RX ORDER — CYCLOBENZAPRINE HCL 10 MG
10 TABLET ORAL 2 TIMES DAILY PRN
Qty: 30 TABLET | Refills: 0 | Status: SHIPPED | OUTPATIENT
Start: 2020-03-20 | End: 2020-03-30 | Stop reason: SDUPTHER

## 2020-03-20 RX ADMIN — ACETAMINOPHEN 650 MG: 325 TABLET ORAL at 21:25

## 2020-03-20 ASSESSMENT — PAIN DESCRIPTION - PAIN TYPE: TYPE: ACUTE PAIN

## 2020-03-20 ASSESSMENT — PAIN DESCRIPTION - ORIENTATION: ORIENTATION: LEFT

## 2020-03-20 ASSESSMENT — PAIN SCALES - GENERAL: PAINLEVEL_OUTOF10: 10

## 2020-03-21 NOTE — ED PROVIDER NOTES
ED Triage Vitals [03/20/20 2038]   BP Temp Temp Source Pulse Resp SpO2 Height Weight   (!) 158/89 98 °F (36.7 °C) Oral 82 20 96 % 5' 10\" (1.778 m) 268 lb (121.6 kg)       Physical Exam  Physical Exam   Constitutional:  Appears well, well-developed and well-nourished. No distress noted. Non toxic in appearance. Morbidly obese  HENT:     Head: Normocephalic and atraumatic. Right Ear: External ear normal. TM normal pearly light reflex. No evidence of hemotympanum, michaud sign, mass or tenderness on examination. No otorrhea. LeftEar: External ear normal. TM normal pearly light reflex. No evidence of hemotympanum, michaud sign, mass or tenderness on examination. No otorrhea. Nose: Nose normal.     Mouth/Throat: Oropharynx is clear and mucosa moist. No oropharyngeal exudate noted. Posterior pharynx is pink and noninjected. Eyes: Conjunctivae and EOM are normal. Pupils are equal,round, and reactive to light. No scleral icterus. Neck: Normal range of motion. Neck supple. No tracheal deviation present. No spinous process tenderness with palpation and no step-offs are present. There is however some tenderness along the left trapezius. Range of motion is normal and it does not cause any shooting pain. No pain is elicited with axial loading. Cardiovascular: Normal rate, regular rhythm, normal heartsounds and intact distal pulses. Exam reveals no gallop or friction rub. No murmur heard. Pulmonary/Chest: Effort normal and breath sounds are symmetric and normal. No respiratory distress. There are no wheezes, rales or rhonchi. Mild tenderness is exhibited upon palpation of the anterior chest wall mostly over the left upper region extending to the midsternal region consistent with seatbelt location. No seatbelt marks are noted. Abdominal: Soft. Bowel sounds are normal. No distension or no mass exhibitted. There is no tenderness, rebound, rigidity or guarding.    Genitourinary:   No CVA tenderness noted on examination. Musculoskeletal: normal range of motion of all extremities. Examination of the left shoulder demonstrates normal range of motion with tenderness over the anterior humeral head. No deformities are noted. No edema, tenderness or deformity. Lymphadenopathy:  No cervical adenopathy. Neurological:   alert and oriented to person, place, and time. Normal speech, normal comprehension, normal cognition,  Reflexes are normal.  There are no cranial nerve deficits. Normal muscle tone, motor and sensory function exhibitted. Coordination normal and gait normal.  Normal finger to nose testing. Skin: Skin is warm and dry. There are superficial tender abrasions with erythema and some swelling of the mid forehead region. No rash noted. No diaphoresis. No erythema. No pallor. Psychiatric: Pleasant and cooperative. Normal mood and affect. Behavior is  normal. Judgment and thought content normal.     DIAGNOSTIC RESULTS     EKG: All EKG's are interpreted by the Emergency Department Physician who either signs or Co-signs this chart in the absence of a cardiologist.    Not indicated. RADIOLOGY:   Non-plain film images such as CT, Ultrasoundand MRI are read by the radiologist. Plain radiographic images are visualized and preliminarily interpreted by the emergency physician with the below findings:    Chest x-ray: No acute findings. No pneumothorax. X-ray shoulder left: No acute fracture or dislocation. X-ray cervical spine: Straightening of the lordotic curve. No acute fracture or subluxation. Interpretation per the Radiologist below, if available at the time of this note:    XR SHOULDER LEFT (MIN 2 VIEWS)    (Results Pending)   XR CERVICAL SPINE (2-3 VIEWS)    (Results Pending)   XR CHEST (SINGLE VIEW FRONTAL)    (Results Pending)   CT Head WO Contrast    (Results Pending)     CT of the head without contrast.  A preliminary report was provided by stat read.   No acute intracranial abnormality is

## 2020-03-22 ENCOUNTER — APPOINTMENT (OUTPATIENT)
Dept: GENERAL RADIOLOGY | Age: 35
End: 2020-03-22
Payer: MEDICARE

## 2020-03-22 ENCOUNTER — HOSPITAL ENCOUNTER (EMERGENCY)
Age: 35
Discharge: HOME | End: 2020-03-22
Attending: EMERGENCY MEDICINE
Payer: MEDICARE

## 2020-03-22 VITALS
HEIGHT: 70 IN | SYSTOLIC BLOOD PRESSURE: 149 MMHG | BODY MASS INDEX: 37.22 KG/M2 | OXYGEN SATURATION: 97 % | WEIGHT: 260 LBS | TEMPERATURE: 98.1 F | HEART RATE: 57 BPM | DIASTOLIC BLOOD PRESSURE: 74 MMHG | RESPIRATION RATE: 20 BRPM

## 2020-03-22 LAB
EKG ATRIAL RATE: 55 BPM
EKG P AXIS: 39 DEGREES
EKG P-R INTERVAL: 148 MS
EKG Q-T INTERVAL: 404 MS
EKG QRS DURATION: 90 MS
EKG QTC CALCULATION (BAZETT): 386 MS
EKG R AXIS: -1 DEGREES
EKG T AXIS: 49 DEGREES
EKG VENTRICULAR RATE: 55 BPM
INFLUENZA A BY PCR: NEGATIVE
INFLUENZA B BY PCR: NEGATIVE

## 2020-03-22 PROCEDURE — 96372 THER/PROPH/DIAG INJ SC/IM: CPT

## 2020-03-22 PROCEDURE — 93005 ELECTROCARDIOGRAM TRACING: CPT

## 2020-03-22 PROCEDURE — 99285 EMERGENCY DEPT VISIT HI MDM: CPT

## 2020-03-22 PROCEDURE — 6360000002 HC RX W HCPCS: Performed by: EMERGENCY MEDICINE

## 2020-03-22 PROCEDURE — 87502 INFLUENZA DNA AMP PROBE: CPT

## 2020-03-22 PROCEDURE — 71046 X-RAY EXAM CHEST 2 VIEWS: CPT

## 2020-03-22 RX ORDER — ORPHENADRINE CITRATE 30 MG/ML
60 INJECTION INTRAMUSCULAR; INTRAVENOUS ONCE
Status: COMPLETED | OUTPATIENT
Start: 2020-03-22 | End: 2020-03-22

## 2020-03-22 RX ADMIN — ORPHENADRINE CITRATE 60 MG: 30 INJECTION INTRAMUSCULAR; INTRAVENOUS at 14:37

## 2020-03-22 ASSESSMENT — ENCOUNTER SYMPTOMS
PHOTOPHOBIA: 0
SORE THROAT: 0
COLOR CHANGE: 0
CONSTIPATION: 0
COUGH: 0
SINUS PRESSURE: 0
EYE PAIN: 0
ABDOMINAL DISTENTION: 0
VOMITING: 0
BACK PAIN: 0
RHINORRHEA: 0
SHORTNESS OF BREATH: 0
NAUSEA: 0
DIARRHEA: 0
WHEEZING: 0
ABDOMINAL PAIN: 0
APNEA: 0

## 2020-03-22 ASSESSMENT — PAIN DESCRIPTION - ONSET
ONSET: ON-GOING
ONSET: PROGRESSIVE

## 2020-03-22 ASSESSMENT — PAIN DESCRIPTION - PAIN TYPE: TYPE: ACUTE PAIN

## 2020-03-22 ASSESSMENT — PAIN SCALES - GENERAL
PAINLEVEL_OUTOF10: 9
PAINLEVEL_OUTOF10: 9

## 2020-03-22 ASSESSMENT — PAIN DESCRIPTION - DESCRIPTORS: DESCRIPTORS: PRESSURE;ACHING

## 2020-03-22 ASSESSMENT — PAIN DESCRIPTION - PROGRESSION: CLINICAL_PROGRESSION: GRADUALLY WORSENING

## 2020-03-22 ASSESSMENT — PAIN - FUNCTIONAL ASSESSMENT: PAIN_FUNCTIONAL_ASSESSMENT: PREVENTS OR INTERFERES SOME ACTIVE ACTIVITIES AND ADLS

## 2020-03-22 ASSESSMENT — PAIN DESCRIPTION - FREQUENCY: FREQUENCY: CONTINUOUS
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
## 2020-03-30 ENCOUNTER — APPOINTMENT (OUTPATIENT)
Dept: CT IMAGING | Age: 35
End: 2020-03-30
Payer: MEDICARE

## 2020-03-30 ENCOUNTER — HOSPITAL ENCOUNTER (EMERGENCY)
Age: 35
Discharge: HOME | End: 2020-03-30
Attending: EMERGENCY MEDICINE
Payer: MEDICARE

## 2020-03-30 ENCOUNTER — APPOINTMENT (OUTPATIENT)
Dept: GENERAL RADIOLOGY | Age: 35
End: 2020-03-30
Payer: MEDICARE

## 2020-03-30 VITALS
OXYGEN SATURATION: 96 % | HEART RATE: 102 BPM | RESPIRATION RATE: 18 BRPM | BODY MASS INDEX: 38.37 KG/M2 | WEIGHT: 268 LBS | DIASTOLIC BLOOD PRESSURE: 90 MMHG | HEIGHT: 70 IN | SYSTOLIC BLOOD PRESSURE: 136 MMHG | TEMPERATURE: 98.1 F

## 2020-03-30 PROCEDURE — 73030 X-RAY EXAM OF SHOULDER: CPT

## 2020-03-30 PROCEDURE — 72125 CT NECK SPINE W/O DYE: CPT

## 2020-03-30 PROCEDURE — 99283 EMERGENCY DEPT VISIT LOW MDM: CPT

## 2020-03-30 RX ORDER — CYCLOBENZAPRINE HCL 10 MG
10 TABLET ORAL 2 TIMES DAILY PRN
Qty: 30 TABLET | Refills: 0 | Status: SHIPPED | OUTPATIENT
Start: 2020-03-30 | End: 2020-04-09

## 2020-03-30 ASSESSMENT — PAIN DESCRIPTION - ORIENTATION: ORIENTATION: LEFT

## 2020-03-30 ASSESSMENT — PAIN DESCRIPTION - DESCRIPTORS: DESCRIPTORS: STABBING

## 2020-03-30 ASSESSMENT — PAIN DESCRIPTION - LOCATION: LOCATION: SHOULDER;NECK

## 2020-03-30 ASSESSMENT — PAIN SCALES - GENERAL: PAINLEVEL_OUTOF10: 9

## 2020-03-30 ASSESSMENT — PAIN DESCRIPTION - ONSET: ONSET: ON-GOING

## 2020-03-31 ENCOUNTER — HOSPITAL ENCOUNTER (EMERGENCY)
Age: 35
Discharge: HOME | End: 2020-03-31
Attending: EMERGENCY MEDICINE
Payer: MEDICARE

## 2020-03-31 VITALS
RESPIRATION RATE: 16 BRPM | SYSTOLIC BLOOD PRESSURE: 146 MMHG | TEMPERATURE: 98 F | HEIGHT: 70 IN | BODY MASS INDEX: 38.37 KG/M2 | WEIGHT: 268 LBS | DIASTOLIC BLOOD PRESSURE: 89 MMHG | HEART RATE: 89 BPM | OXYGEN SATURATION: 98 %

## 2020-03-31 PROCEDURE — 99283 EMERGENCY DEPT VISIT LOW MDM: CPT

## 2020-03-31 RX ORDER — IBUPROFEN 400 MG/1
800 TABLET ORAL ONCE
Status: DISCONTINUED | OUTPATIENT
Start: 2020-03-31 | End: 2020-04-01 | Stop reason: HOSPADM

## 2020-03-31 RX ORDER — METHYLPREDNISOLONE SODIUM SUCCINATE 125 MG/2ML
125 INJECTION, POWDER, LYOPHILIZED, FOR SOLUTION INTRAMUSCULAR; INTRAVENOUS ONCE
Status: DISCONTINUED | OUTPATIENT
Start: 2020-03-31 | End: 2020-04-01 | Stop reason: HOSPADM

## 2020-03-31 RX ORDER — CYCLOBENZAPRINE HCL 10 MG
10 TABLET ORAL NIGHTLY PRN
Qty: 10 TABLET | Refills: 0 | Status: SHIPPED | OUTPATIENT
Start: 2020-03-31 | End: 2020-04-10

## 2020-03-31 RX ORDER — LANOLIN ALCOHOL/MO/W.PET/CERES
3 CREAM (GRAM) TOPICAL NIGHTLY PRN
Qty: 15 TABLET | Refills: 3 | Status: SHIPPED | OUTPATIENT
Start: 2020-03-31

## 2020-03-31 RX ORDER — METHYLPREDNISOLONE 4 MG/1
TABLET ORAL
Qty: 1 KIT | Refills: 0 | Status: SHIPPED | OUTPATIENT
Start: 2020-03-31 | End: 2020-10-02 | Stop reason: ALTCHOICE

## 2020-03-31 RX ORDER — CYCLOBENZAPRINE HCL 10 MG
10 TABLET ORAL ONCE
Status: DISCONTINUED | OUTPATIENT
Start: 2020-03-31 | End: 2020-04-01 | Stop reason: HOSPADM

## 2020-03-31 ASSESSMENT — PAIN DESCRIPTION - PAIN TYPE: TYPE: ACUTE PAIN

## 2020-03-31 ASSESSMENT — PAIN SCALES - GENERAL: PAINLEVEL_OUTOF10: 8

## 2020-03-31 ASSESSMENT — PAIN DESCRIPTION - DESCRIPTORS: DESCRIPTORS: STABBING

## 2020-03-31 ASSESSMENT — PAIN DESCRIPTION - LOCATION: LOCATION: HEAD;NECK

## 2020-03-31 ASSESSMENT — PAIN DESCRIPTION - FREQUENCY: FREQUENCY: CONTINUOUS

## 2020-04-01 NOTE — ED PROVIDER NOTES
Non-medical: None   Tobacco Use    Smoking status: Current Every Day Smoker     Packs/day: 0.50     Types: Cigarettes    Smokeless tobacco: Never Used    Tobacco comment: trying to quit   Substance and Sexual Activity    Alcohol use: No    Drug use: No    Sexual activity: Yes     Partners: Female   Lifestyle    Physical activity     Days per week: None     Minutes per session: None    Stress: None   Relationships    Social connections     Talks on phone: None     Gets together: None     Attends Spiritism service: None     Active member of club or organization: None     Attends meetings of clubs or organizations: None     Relationship status: None    Intimate partner violence     Fear of current or ex partner: None     Emotionally abused: None     Physically abused: None     Forced sexual activity: None   Other Topics Concern    None   Social History Narrative    None       SCREENINGS      @FLOW([id number])@      PHYSICAL EXAM    (up to 7 for level 4, 8 or more for level 5)     ED Triage Vitals [03/31/20 2252]   BP Temp Temp Source Pulse Resp SpO2 Height Weight   (!) 146/89 98 °F (36.7 °C) Temporal 89 16 98 % 5' 10\" (1.778 m) 268 lb (121.6 kg)       Physical Exam     CONST: -Well-developed well-nourished ;                -In no acute distress. -Vitals reviewed. EYES: -EOM intact, LUIS:              -Sclera normal and conjunctiva: clear bilaterally.   ENT: - Normal pharynx pink and moist.    NECK: There is paraspinous spasm at cervical 5 in the paraspinous musculature nothing in the midline  CARD: -Rate and rhythm: Regular              -Murmurs: No  RESP: -Respiratory effort and chest excursion with respirations: Normal             -Breath sounds equal bilaterally: Clear             -Wheezes: No             -Rales: No    BACK: -Flank pain: No              -Pain on palpation: No    ABD: -Distended: No           -Bruits: No           -Bowel sounds: Normal.           -Deep palpation: and are normal.  There is no evidence of acute fracture. I believe this is a disc irritation exacerbated by trauma    CRITICAL CARE TIME   Total Critical Care time was  minutes, excluding separately reportableprocedures. There was a high probability of clinicallysignificant/life threatening deterioration in the patient's condition which required my urgent intervention. CONSULTS:  None    PROCEDURES:  Unless otherwise noted below, none     Procedures    FINAL IMPRESSION      1. Cervical radiculopathy    2. Degenerative disc disease, cervical    3. Drug-seeking behavior    4. Anxiety state          DISPOSITION/PLAN   DISPOSITION Decision To Discharge 03/31/2020 11:01:06 PM      PATIENT REFERRED TO:  Leah Carroll MD  [place] [address] [facility] [address]  [phone]    In 2 days        DISCHARGE MEDICATIONS:  New Prescriptions    CYCLOBENZAPRINE (FLEXERIL) 10 MG TABLET    Take 1 tablet by mouth nightly as needed for Muscle spasms    MELATONIN (RA MELATONIN) 3 MG TABS TABLET    Take 1 tablet by mouth nightly as needed (insomnia)    METHYLPREDNISOLONE (MEDROL, GENA,) 4 MG TABLET    Take by mouth.           (Please note that portions of this note were completed with a voice recognition program.  Efforts were made to edit the dictations but occasionally words are mis-transcribed.)    Hafsa Ching MD (electronically signed)  Attending Emergency Physician          Hafsa Ching MD  03/31/20 [id number]

## 2020-10-02 ENCOUNTER — APPOINTMENT (OUTPATIENT)
Dept: GENERAL RADIOLOGY | Age: 35
End: 2020-10-02
Payer: COMMERCIAL

## 2020-10-02 ENCOUNTER — HOSPITAL ENCOUNTER (EMERGENCY)
Age: 35
Discharge: HOME | End: 2020-10-02
Attending: EMERGENCY MEDICINE
Payer: COMMERCIAL

## 2020-10-02 VITALS
OXYGEN SATURATION: 98 % | WEIGHT: 270 LBS | TEMPERATURE: 97.7 F | RESPIRATION RATE: 20 BRPM | HEART RATE: 92 BPM | BODY MASS INDEX: 36.57 KG/M2 | HEIGHT: 72 IN

## 2020-10-02 PROCEDURE — 6370000000 HC RX 637 (ALT 250 FOR IP): Performed by: EMERGENCY MEDICINE

## 2020-10-02 PROCEDURE — 72110 X-RAY EXAM L-2 SPINE 4/>VWS: CPT

## 2020-10-02 PROCEDURE — 99282 EMERGENCY DEPT VISIT SF MDM: CPT

## 2020-10-02 PROCEDURE — 99281 EMR DPT VST MAYX REQ PHY/QHP: CPT

## 2020-10-02 RX ORDER — HYDROCODONE BITARTRATE AND ACETAMINOPHEN 5; 325 MG/1; MG/1
1 TABLET ORAL ONCE
Status: COMPLETED | OUTPATIENT
Start: 2020-10-02 | End: 2020-10-02

## 2020-10-02 RX ORDER — NAPROXEN 500 MG/1
500 TABLET ORAL 2 TIMES DAILY
Qty: 10 TABLET | Refills: 0 | Status: SHIPPED | OUTPATIENT
Start: 2020-10-02 | End: 2020-11-02 | Stop reason: ALTCHOICE

## 2020-10-02 RX ORDER — CYCLOBENZAPRINE HCL 10 MG
10 TABLET ORAL 3 TIMES DAILY PRN
Qty: 20 TABLET | Refills: 0 | Status: SHIPPED | OUTPATIENT
Start: 2020-10-02 | End: 2020-10-12

## 2020-10-02 RX ORDER — HYDROCODONE BITARTRATE AND ACETAMINOPHEN 5; 325 MG/1; MG/1
1 TABLET ORAL EVERY 6 HOURS PRN
Qty: 10 TABLET | Refills: 0 | Status: SHIPPED | OUTPATIENT
Start: 2020-10-02 | End: 2020-10-05

## 2020-10-02 RX ADMIN — HYDROCODONE BITARTRATE AND ACETAMINOPHEN 1 TABLET: 5; 325 TABLET ORAL at 23:02

## 2020-10-02 ASSESSMENT — PAIN DESCRIPTION - DESCRIPTORS: DESCRIPTORS: TIGHTNESS

## 2020-10-02 ASSESSMENT — PAIN SCALES - GENERAL: PAINLEVEL_OUTOF10: 8

## 2020-10-02 ASSESSMENT — PAIN - FUNCTIONAL ASSESSMENT: PAIN_FUNCTIONAL_ASSESSMENT: PREVENTS OR INTERFERES SOME ACTIVE ACTIVITIES AND ADLS

## 2020-10-02 ASSESSMENT — PAIN DESCRIPTION - ONSET: ONSET: SUDDEN

## 2020-10-02 ASSESSMENT — PAIN DESCRIPTION - ORIENTATION: ORIENTATION: MID;LOWER

## 2020-10-02 ASSESSMENT — PAIN DESCRIPTION - LOCATION: LOCATION: BACK

## 2020-10-03 NOTE — ED TRIAGE NOTES
Pt was working and hurt back lifting. Pt with hx of back pain. Prior to getting Norco patient stated that he was dropped off and he has a ride home. Discussed that he would not be able to drive his car after getting Norco however  saw the patient getting into his car and drove off. [address] notified.

## 2020-10-03 NOTE — ED PROVIDER NOTES
CC/HPI: 58-year-old male to the emergency department chief complaint of low back pain. Patient states he has a history of back issues. 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 Patient states after carrying approximately 5 or 6 of them he went to lift 1 and felt a pop and discomfort in his lower back. Patient states he had to finish the job so carried the remaining units through the discomfort. Patient states that afterwards he is taken ibuprofen however the pain worsened so he came to the emergency department. Patient denies any urinary symptoms no changes to bowel movements. Patient denies radicular symptoms. No fever no chills no nausea no vomiting no abdominal pain    VITALS/PMH/PSH: Reviewed per nurses notes    REVIEW OF SYSTEMS: As in chief complaint history of present illness, otherwise all other systems are reviewed and negative the total 10 systems reviewed    GEN: Pt alert and oriented, no acute distress. Patient appears uncomfortable with movement  HEENT:         Normocephalic/Atramatic        PERRL, EOMI       Throat nonerythematous or edematous. No exudates noted. Moist membranes  NECK: Nontender, no signs of trauma, no lymphadenopathy  HEART: Reg S1/S2, without murmer, rub or gallop  LUNGS: Clear to auscultation bilaterally, respirations even and unlabored  ABDOMEN: soft, nontender, nondistended. No guarding rebound or rigidity  MUSCULOSKELETAL/EXTREMITITES:  No signs of trauma, cyanosis or edema. No CVA tenderness. No redness warmth or signs of infection. No step-offs noted. Patient diffusely tender mid to lower lumbar spine and bilateral paraspinal musculature without obvious spasm. Patient also tender in the right sacroiliac joint. Patient neurovascularly intact distally with strong equal pulses.   No calf swelling or tenderness  LYMPH: no peripheral lympadenopathy noted  SKIN:  Warm & dry, no rash  NEUROLOGIC:  Alert and

## 2020-10-09 ENCOUNTER — HOSPITAL ENCOUNTER (EMERGENCY)
Age: 35
Discharge: HOME | End: 2020-10-09
Attending: EMERGENCY MEDICINE
Payer: COMMERCIAL

## 2020-10-09 VITALS
OXYGEN SATURATION: 96 % | TEMPERATURE: 98.3 F | BODY MASS INDEX: 36.57 KG/M2 | DIASTOLIC BLOOD PRESSURE: 73 MMHG | SYSTOLIC BLOOD PRESSURE: 128 MMHG | HEART RATE: 82 BPM | HEIGHT: 72 IN | RESPIRATION RATE: 18 BRPM | WEIGHT: 270 LBS

## 2020-10-09 PROCEDURE — 99282 EMERGENCY DEPT VISIT SF MDM: CPT

## 2020-10-09 PROCEDURE — 99283 EMERGENCY DEPT VISIT LOW MDM: CPT

## 2020-10-09 PROCEDURE — 6370000000 HC RX 637 (ALT 250 FOR IP): Performed by: EMERGENCY MEDICINE

## 2020-10-09 RX ORDER — OXYCODONE HYDROCHLORIDE AND ACETAMINOPHEN 5; 325 MG/1; MG/1
1-2 TABLET ORAL EVERY 6 HOURS PRN
Qty: 10 TABLET | Refills: 0 | Status: SHIPPED | OUTPATIENT
Start: 2020-10-09 | End: 2020-10-12

## 2020-10-09 RX ORDER — INSULIN GLARGINE 100 [IU]/ML
INJECTION, SOLUTION SUBCUTANEOUS NIGHTLY
COMMUNITY

## 2020-10-09 RX ORDER — OXYCODONE HYDROCHLORIDE AND ACETAMINOPHEN 5; 325 MG/1; MG/1
1 TABLET ORAL ONCE
Status: COMPLETED | OUTPATIENT
Start: 2020-10-09 | End: 2020-10-09

## 2020-10-09 RX ORDER — CYCLOBENZAPRINE HCL 10 MG
10 TABLET ORAL 3 TIMES DAILY PRN
Qty: 30 TABLET | Refills: 0 | Status: SHIPPED | OUTPATIENT
Start: 2020-10-09 | End: 2020-10-13 | Stop reason: ALTCHOICE

## 2020-10-09 RX ADMIN — OXYCODONE HYDROCHLORIDE AND ACETAMINOPHEN 1 TABLET: 5; 325 TABLET ORAL at 21:38

## 2020-10-09 ASSESSMENT — PAIN DESCRIPTION - PAIN TYPE: TYPE: ACUTE PAIN

## 2020-10-09 ASSESSMENT — ENCOUNTER SYMPTOMS
VOMITING: 0
APNEA: 0
EYE PAIN: 0
PHOTOPHOBIA: 0
SHORTNESS OF BREATH: 0
BACK PAIN: 1
COUGH: 0
COLOR CHANGE: 0
RHINORRHEA: 0
WHEEZING: 0
DIARRHEA: 0
SORE THROAT: 0
SINUS PRESSURE: 0
ABDOMINAL PAIN: 0
NAUSEA: 0
ABDOMINAL DISTENTION: 0
CONSTIPATION: 0

## 2020-10-09 ASSESSMENT — PAIN SCALES - GENERAL
PAINLEVEL_OUTOF10: 8
PAINLEVEL_OUTOF10: 8

## 2020-10-09 ASSESSMENT — PAIN DESCRIPTION - LOCATION: LOCATION: BACK

## 2020-10-09 ASSESSMENT — PAIN DESCRIPTION - ORIENTATION: ORIENTATION: LOWER

## 2020-10-10 NOTE — ED PROVIDER NOTES
[facility] ED  eMERGENCY dEPARTMENT eNCOUnter      Pt Name: Andrés Angulo  MRN: [id number]  [place] 1985  Date of evaluation: 10/9/2020  Provider: Jose Angel Rodriguez MD    CHIEF COMPLAINT       Chief Complaint   Patient presents with    Back Pain     chronic with new injury one week ago, hurt today getting out of bed         HISTORY OF PRESENT ILLNESS   (Location/Symptom, Timing/Onset,Context/Setting, Quality, Duration, Modifying Factors, Severity)  Note limiting factors. Andrés Angulo is a 38 y.o. male who presents to the emergency department with complaint of back pain worse over the last 1 week. Patient gives history of chronic back pain. Pain does not radiate. Denies new injuries. Pain is sharp. Pain is 7 in a scale of 1-10  No other complaints. No extremity weakness or numbness. HPI    Nursing Notes were reviewed. REVIEW OF SYSTEMS    (2-9 systems for level 4, 10 or more for level 5)     Review of Systems   Constitutional: Negative. Negative for activity change, appetite change, chills, fatigue and fever. HENT: Negative for congestion, ear discharge, ear pain, hearing loss, rhinorrhea, sinus pressure and sore throat. Eyes: Negative for photophobia, pain and visual disturbance. Respiratory: Negative for apnea, cough, shortness of breath and wheezing. Cardiovascular: Negative for chest pain, palpitations and leg swelling. Gastrointestinal: Negative for abdominal distention, abdominal pain, constipation, diarrhea, nausea and vomiting. Endocrine: Negative for cold intolerance, heat intolerance and polyuria. Genitourinary: Negative for dysuria, flank pain, frequency and urgency. Musculoskeletal: Positive for arthralgias, back pain and myalgias. Negative for gait problem and neck stiffness. Skin: Negative for color change, pallor and rash. Allergic/Immunologic: Negative for food allergies and immunocompromised state.    Neurological: Negative for dizziness, tremors, mouth nightly as needed (insomnia)    MELOXICAM (MOBIC) 15 MG TABLET    Take 1 tablet by mouth daily for 7 days Take with food, avoid other NSAIDs (Ibuprofen) and steroids    METFORMIN (GLUCOPHAGE-XR) 500 MG EXTENDED RELEASE TABLET    Take 1 tablet by mouth 2 times daily    METOPROLOL TARTRATE (LOPRESSOR) 50 MG TABLET    Take 1 tablet by mouth 2 times daily    NABUMETONE (RELAFEN) 750 MG TABLET    Take 1 tablet by mouth 2 times daily for 7 days Take with food, avoid other NSAIDs (Ibuprofen, Diclofenac) and steroids    NAPROXEN (NAPROSYN) 500 MG TABLET    Take 1 tablet by mouth 2 times daily (with meals) for 7 days    NAPROXEN (NAPROSYN) 500 MG TABLET    Take 1 tablet by mouth 2 times daily       ALLERGIES     Buspar [buspirone];  Clindamycin/lincomycin; Tramadol; and Ketorolac    FAMILY HISTORY       Family History   Problem Relation Age of Onset    Heart Disease Mother     High Blood Pressure Mother     Diabetes Mother     High Blood Pressure Father           SOCIAL HISTORY       Social History     Socioeconomic History    Marital status: Single     Spouse name: Not on file    Number of children: Not on file    Years of education: Not on file    Highest education level: Not on file   Occupational History    Not on file   Social Needs    Financial resource strain: Not on file    Food insecurity     Worry: Not on file     Inability: Not on file    Transportation needs     Medical: Not on file     Non-medical: Not on file   Tobacco Use    Smoking status: Current Every Day Smoker     Packs/day: 0.50     Types: Cigarettes    Smokeless tobacco: Never Used    Tobacco comment: trying to quit   Substance and Sexual Activity    Alcohol use: No    Drug use: No    Sexual activity: Yes     Partners: Female   Lifestyle    Physical activity     Days per week: Not on file     Minutes per session: Not on file    Stress: Not on file   Relationships    Social connections     Talks on phone: Not on file     Gets together: Not on file     Attends Congregational service: Not on file     Active member of club or organization: Not on file     Attends meetings of clubs or organizations: Not on file     Relationship status: Not on file    Intimate partner violence     Fear of current or ex partner: Not on file     Emotionally abused: Not on file     Physically abused: Not on file     Forced sexual activity: Not on file   Other Topics Concern    Not on file   Social History Narrative    Not on file       SCREENINGS             PHYSICAL EXAM    (up to 7 for level 4, 8 or more for level 5)     ED Triage Vitals [10/09/20 2123]   BP Temp Temp Source Pulse Resp SpO2 Height Weight   128/73 98.3 °F (36.8 °C) Oral 82 18 96 % 6' (1.829 m) 270 lb (122.5 kg)       Physical Exam  Vitals signs and nursing note reviewed. Constitutional:       General: He is in acute distress. Appearance: Normal appearance. He is well-developed. He is obese. He is not ill-appearing, toxic-appearing or diaphoretic. HENT:      Head: Normocephalic and atraumatic. Nose: Nose normal. No congestion or rhinorrhea. Mouth/Throat:      Mouth: Mucous membranes are moist.      Pharynx: Oropharynx is clear. No oropharyngeal exudate or posterior oropharyngeal erythema. Eyes:      General: No scleral icterus. Right eye: No discharge. Left eye: No discharge. Extraocular Movements: Extraocular movements intact. Conjunctiva/sclera: Conjunctivae normal.      Pupils: Pupils are equal, round, and reactive to light. Neck:      Musculoskeletal: Normal range of motion and neck supple. No neck rigidity or muscular tenderness. Thyroid: No thyromegaly. Vascular: No carotid bruit or JVD. Trachea: No tracheal deviation. Cardiovascular:      Rate and Rhythm: Normal rate and regular rhythm. Heart sounds: Normal heart sounds. No murmur. No friction rub. No gallop.     Pulmonary:      Effort: Pulmonary effort is normal. No respiratory distress. Breath sounds: Normal breath sounds. No stridor. No wheezing, rhonchi or rales. Chest:      Chest wall: No tenderness. Abdominal:      General: Bowel sounds are normal. There is no distension. Palpations: Abdomen is soft. There is no mass. Tenderness: There is no abdominal tenderness. There is no right CVA tenderness, left CVA tenderness, guarding or rebound. Hernia: No hernia is present. Musculoskeletal: Normal range of motion. General: No swelling, tenderness, deformity or signs of injury. Right lower leg: No edema. Left lower leg: No edema. Lymphadenopathy:      Cervical: No cervical adenopathy. Skin:     General: Skin is warm and dry. Coloration: Skin is not jaundiced or pale. Findings: No bruising, erythema, lesion or rash. Neurological:      Mental Status: He is alert and oriented to person, place, and time. Cranial Nerves: No cranial nerve deficit. Sensory: No sensory deficit. Motor: No weakness or abnormal muscle tone. Coordination: Coordination normal.      Gait: Gait normal.      Deep Tendon Reflexes: Reflexes are normal and symmetric. Reflexes normal.   Psychiatric:         Behavior: Behavior normal.         Thought Content:  Thought content normal.         Judgment: Judgment normal.         DIAGNOSTIC RESULTS     EKG: All EKG's are interpreted by the Emergency Department Physician who either signs or Co-signs this chart in the absence of a cardiologist.        RADIOLOGY:   Non-plain film images such as CT, Ultrasound and MRI are read by the radiologist. Ronni Foley radiographicimages are visualized and preliminarily interpreted by the emergency physician with the below findings:        Interpretation per the Radiologist below, if available at the time of this note:    No orders to display         ED BEDSIDE ULTRASOUND:   Performed by ED Physician - none    LABS:  Labs Reviewed - No data to display    All other MD  10/09/20 8526

## 2020-10-13 ENCOUNTER — HOSPITAL ENCOUNTER (EMERGENCY)
Age: 35
Discharge: HOME | End: 2020-10-13
Attending: EMERGENCY MEDICINE
Payer: MEDICARE

## 2020-10-13 VITALS
WEIGHT: 270 LBS | OXYGEN SATURATION: 96 % | BODY MASS INDEX: 38.65 KG/M2 | HEIGHT: 70 IN | TEMPERATURE: 98.1 F | RESPIRATION RATE: 16 BRPM | HEART RATE: 104 BPM | SYSTOLIC BLOOD PRESSURE: 144 MMHG | DIASTOLIC BLOOD PRESSURE: 77 MMHG

## 2020-10-13 PROCEDURE — 99283 EMERGENCY DEPT VISIT LOW MDM: CPT

## 2020-10-13 PROCEDURE — 99282 EMERGENCY DEPT VISIT SF MDM: CPT

## 2020-10-13 RX ORDER — HYDROCODONE BITARTRATE AND ACETAMINOPHEN 5; 325 MG/1; MG/1
1 TABLET ORAL EVERY 4 HOURS PRN
Qty: 18 TABLET | Refills: 0 | Status: SHIPPED | OUTPATIENT
Start: 2020-10-13 | End: 2020-10-13 | Stop reason: SDUPTHER

## 2020-10-13 RX ORDER — CYCLOBENZAPRINE HCL 10 MG
10 TABLET ORAL 3 TIMES DAILY PRN
Qty: 30 TABLET | Refills: 0 | Status: SHIPPED | OUTPATIENT
Start: 2020-10-13 | End: 2020-10-23

## 2020-10-13 RX ORDER — HYDROCODONE BITARTRATE AND ACETAMINOPHEN 5; 325 MG/1; MG/1
1 TABLET ORAL EVERY 6 HOURS PRN
Qty: 12 TABLET | Refills: 0 | Status: SHIPPED | OUTPATIENT
Start: 2020-10-13 | End: 2020-10-16

## 2020-10-13 ASSESSMENT — PAIN DESCRIPTION - LOCATION
LOCATION: BACK
LOCATION: BACK

## 2020-10-13 ASSESSMENT — PAIN DESCRIPTION - PAIN TYPE
TYPE: ACUTE PAIN
TYPE: CHRONIC PAIN

## 2020-10-13 ASSESSMENT — PAIN DESCRIPTION - ORIENTATION
ORIENTATION: LOWER
ORIENTATION: LOWER

## 2020-10-13 ASSESSMENT — PAIN - FUNCTIONAL ASSESSMENT: PAIN_FUNCTIONAL_ASSESSMENT: 0-10

## 2020-10-13 ASSESSMENT — PAIN DESCRIPTION - ONSET: ONSET: ON-GOING

## 2020-10-13 ASSESSMENT — PAIN DESCRIPTION - DESCRIPTORS
DESCRIPTORS: SHARP
DESCRIPTORS: ACHING;SORE

## 2020-10-13 ASSESSMENT — PAIN SCALES - GENERAL
PAINLEVEL_OUTOF10: 8
PAINLEVEL_OUTOF10: 6

## 2020-10-13 ASSESSMENT — PAIN DESCRIPTION - FREQUENCY: FREQUENCY: CONTINUOUS

## 2020-10-13 NOTE — ED NOTES
Patient presents with complaint of lower back pain, chronic in nature. Patient states he has had this before but the pain has increased in intensity and duration. Patient able to move all extremities, patient denies loss of bowel or bladder. Patient positioned himself for comfort.      Ashley Nicole RN  10/13/20 [id number]

## 2020-10-13 NOTE — ED PROVIDER NOTES
or rubs. 2+ distal pulses  Chest: no chest wall tenderness  Abdomen: Soft. Non tender. Non distended. +BS. No rebound, guarding, or rigidity. No pulsatile masses appreciated. Musculoskeletal: Moves all extremities x 4. Warm and well perfused, no clubbing, cyanosis, or edema. Capillary refill <3 seconds  Skin: warm and dry. No rashes. Back exam reveals no midline tenderness of the thoracic no lumbar spine. No foot drop no saddle paresthesias. Normal gait. There is tenderness of the lumbar paraspinal muscles bilaterally  Neurologic: GCS 15, CN 2-12 grossly intact, no focal deficits, symmetric strength 5/5 in the upper and lower extremities bilaterally  Psych: Normal Affect    -------------------------------------------------- RESULTS -------------------------------------------------  I have personally reviewed all laboratory and imaging results for this patient. Results are listed below. LABS:  No results found for this visit on 10/13/20. RADIOLOGY:  Interpreted by Radiologist.  No orders to display             ------------------------- NURSING NOTES AND VITALS REVIEWED ---------------------------   The nursing notes within the ED encounter and vital signs as below have been reviewed by myself. BP (!) 144/77   Pulse 104   Temp 98.1 °F (36.7 °C) (Oral)   Resp 16   Ht 5' 10\" (1.778 m)   Wt 270 lb (122.5 kg)   SpO2 96%   BMI 38.74 kg/m²   Oxygen Saturation Interpretation: Normal    The patients available past medical records and past encounters were reviewed. ------------------------------ ED COURSE/MEDICAL DECISION MAKING----------------------  Medications - No data to display          Medical Decision Making:    Online records were reviewed he was placed on Flexeril and a 3-day supply of Percocet            This patient's ED course included: a personal history and physicial eaxmination    This patient has remained hemodynamically stable during their ED course. Counseling:    The emergency provider has spoken with the patient and discussed todays results, in addition to providing specific details for the plan of care and counseling regarding the diagnosis and prognosis. Questions are answered at this time and they are agreeable with the plan.       --------------------------------- IMPRESSION AND DISPOSITION ---------------------------------    IMPRESSION  1. Strain of lumbar region, initial encounter        DISPOSITION  Disposition: Discharge to home  Patient condition is good        NOTE: This report was transcribed using voice recognition software.  Every effort was made to ensure accuracy; however, inadvertent computerized transcription errors may be present          Sheyla Taylor MD  10/13/20 6415

## 2020-10-23 ENCOUNTER — HOSPITAL ENCOUNTER (EMERGENCY)
Age: 35
Discharge: HOME | End: 2020-10-23
Attending: EMERGENCY MEDICINE
Payer: MEDICARE

## 2020-10-23 VITALS
RESPIRATION RATE: 16 BRPM | HEART RATE: 91 BPM | BODY MASS INDEX: 38.65 KG/M2 | WEIGHT: 270 LBS | DIASTOLIC BLOOD PRESSURE: 94 MMHG | TEMPERATURE: 98.5 F | SYSTOLIC BLOOD PRESSURE: 158 MMHG | HEIGHT: 70 IN | OXYGEN SATURATION: 98 %

## 2020-10-23 PROCEDURE — 6360000002 HC RX W HCPCS: Performed by: EMERGENCY MEDICINE

## 2020-10-23 PROCEDURE — 96372 THER/PROPH/DIAG INJ SC/IM: CPT

## 2020-10-23 PROCEDURE — 99283 EMERGENCY DEPT VISIT LOW MDM: CPT

## 2020-10-23 RX ORDER — OXYCODONE HYDROCHLORIDE AND ACETAMINOPHEN 5; 325 MG/1; MG/1
1 TABLET ORAL EVERY 6 HOURS PRN
Qty: 10 TABLET | Refills: 0 | Status: SHIPPED | OUTPATIENT
Start: 2020-10-23 | End: 2020-10-26

## 2020-10-23 RX ORDER — ORPHENADRINE CITRATE 30 MG/ML
60 INJECTION INTRAMUSCULAR; INTRAVENOUS ONCE
Status: COMPLETED | OUTPATIENT
Start: 2020-10-23 | End: 2020-10-23

## 2020-10-23 RX ORDER — ORPHENADRINE CITRATE 100 MG/1
100 TABLET, EXTENDED RELEASE ORAL 2 TIMES DAILY
Qty: 20 TABLET | Refills: 0 | Status: SHIPPED | OUTPATIENT
Start: 2020-10-23 | End: 2020-11-02 | Stop reason: ALTCHOICE

## 2020-10-23 RX ADMIN — ORPHENADRINE CITRATE 60 MG: 30 INJECTION INTRAMUSCULAR; INTRAVENOUS at 22:39

## 2020-10-23 ASSESSMENT — PAIN DESCRIPTION - LOCATION: LOCATION: BACK

## 2020-10-23 ASSESSMENT — PAIN DESCRIPTION - PAIN TYPE: TYPE: ACUTE PAIN

## 2020-10-23 ASSESSMENT — ENCOUNTER SYMPTOMS
COUGH: 0
SHORTNESS OF BREATH: 0
ABDOMINAL PAIN: 0
PHOTOPHOBIA: 0
SINUS PRESSURE: 0
EYE PAIN: 0
COLOR CHANGE: 0
ABDOMINAL DISTENTION: 0
WHEEZING: 0
DIARRHEA: 0
CONSTIPATION: 0
NAUSEA: 0
VOMITING: 0
RHINORRHEA: 0
BACK PAIN: 1
APNEA: 0
SORE THROAT: 0

## 2020-10-23 ASSESSMENT — PAIN SCALES - GENERAL
PAINLEVEL_OUTOF10: 8
PAINLEVEL_OUTOF10: 4

## 2020-10-23 ASSESSMENT — PAIN - FUNCTIONAL ASSESSMENT: PAIN_FUNCTIONAL_ASSESSMENT: 0-10

## 2020-10-24 NOTE — ED PROVIDER NOTES
[address] [place] [address] ED  eMERGENCY dEPARTMENT eNCOUnter      Pt Name: Prabhakar Huffman  MRN: [id number]  [place] 1985  Date of evaluation: 10/23/2020  Provider: Celi Crespo MD    CHIEF COMPLAINT       Chief Complaint   Patient presents with    Back Pain     chronic, seen 3x this month for same         HISTORY OF PRESENT ILLNESS   (Location/Symptom, Timing/Onset,Context/Setting, Quality, Duration, Modifying Factors, Severity)  Note limiting factors. Prabhakar Huffman is a 38 y.o. male who presents to the emergency department with complaint of acute on chronic back pain of 3 months duration. Has been seen 3 times this month for same. X-rays obtained to 3 weeks ago did not show any acute pathology, showed moderate to severe disc space narrowing at L1/L2. He denies extremity weakness or numbness. He denies new injuries. Pain is 8 in a scale of 1-10 and worse with weightbearing. HPI    Nursing Notes were reviewed. REVIEW OF SYSTEMS    (2-9 systems for level 4, 10 or more for level 5)     Review of Systems   Constitutional: Negative. Negative for activity change, appetite change, chills, fatigue and fever. HENT: Negative for congestion, ear discharge, ear pain, hearing loss, rhinorrhea, sinus pressure and sore throat. Eyes: Negative for photophobia, pain and visual disturbance. Respiratory: Negative for apnea, cough, shortness of breath and wheezing. Cardiovascular: Negative for chest pain, palpitations and leg swelling. Gastrointestinal: Negative for abdominal distention, abdominal pain, constipation, diarrhea, nausea and vomiting. Endocrine: Negative for cold intolerance, heat intolerance and polyuria. Genitourinary: Negative for dysuria, flank pain, frequency and urgency. Musculoskeletal: Positive for arthralgias, back pain and myalgias. Negative for gait problem and neck stiffness. Skin: Negative for color change, pallor and rash.    Allergic/Immunologic: Negative for food allergies and immunocompromised state. Neurological: Negative for dizziness, tremors, syncope, weakness, light-headedness and headaches. Psychiatric/Behavioral: Negative for agitation, confusion and hallucinations. All other systems reviewed and are negative. Except as noted above the remainder of the review of systems was reviewed and negative. PAST MEDICAL HISTORY     Past Medical History:   Diagnosis Date    Chronic back pain     [facility] [address]     Diabetes mellitus ([facility] [address]     Hypertension     Hypertension          SURGICAL HISTORY       Past Surgical History:   Procedure Laterality Date    HERNIA REPAIR      MOUTH SURGERY      TONSILLECTOMY AND ADENOIDECTOMY           CURRENT MEDICATIONS       Previous Medications    BLOOD GLUCOSE MONITORING SUPPL (FREESTYLE LITE) NICHOLAS    U UTD TO TEST 5-6 TIMES D    BLOOD PRESSURE MONITORING (BLOOD PRESSURE KIT) NICHOLAS    Dispense 1 machine with large cuff, diagnosis hypertension and diabetes. Patient is to take blood pressure 2x a day and record.     CYCLOBENZAPRINE (FLEXERIL) 10 MG TABLET    Take 1 tablet by mouth 3 times daily as needed for Muscle spasms    DICLOFENAC (FLECTOR) 1.3 % PATCH    Place 1 patch onto the skin 2 times daily    DICLOFENAC (VOLTAREN) 75 MG EC TABLET    Take 1 tablet by mouth 2 times daily (with meals) Take with food, avoid other NSAIDs (Ibuprofen) and steroids    FREESTYLE LITE STRIP    1 each by Does not apply route 3 times daily    GLIPIZIDE (GLUCOTROL) 5 MG TABLET    Take 1 tablet by mouth 2 times daily (before meals)    INSULIN ASPART (NOVOLOG SC)    Inject into the skin    INSULIN GLARGINE (LANTUS) 100 UNIT/ML INJECTION VIAL    Inject into the skin nightly    LATANOPROST (XALATAN) 0.005 % OPHTHALMIC SOLUTION    Place 1 drop into both eyes nightly    LIDOCAINE (LMX) 4 % CREAM    Apply a half dollar sized amount to intact skin topically up to twice daily as needed for pain    LIDOCAINE (ZTLIDO) 1.8 % PTCH    Apply 1 patch topically daily    MELATONIN (RA MELATONIN) 3 MG TABS TABLET    Take 1 tablet by mouth nightly as needed (insomnia)    MELOXICAM (MOBIC) 15 MG TABLET    Take 1 tablet by mouth daily for 7 days Take with food, avoid other NSAIDs (Ibuprofen) and steroids    METFORMIN (GLUCOPHAGE-XR) 500 MG EXTENDED RELEASE TABLET    Take 1 tablet by mouth 2 times daily    METOPROLOL TARTRATE (LOPRESSOR) 50 MG TABLET    Take 1 tablet by mouth 2 times daily    NABUMETONE (RELAFEN) 750 MG TABLET    Take 1 tablet by mouth 2 times daily for 7 days Take with food, avoid other NSAIDs (Ibuprofen, Diclofenac) and steroids    NAPROXEN (NAPROSYN) 500 MG TABLET    Take 1 tablet by mouth 2 times daily (with meals) for 7 days    NAPROXEN (NAPROSYN) 500 MG TABLET    Take 1 tablet by mouth 2 times daily       ALLERGIES     Buspar [buspirone];  Clindamycin/lincomycin; Tramadol; and Ketorolac    FAMILY HISTORY       Family History   Problem Relation Age of Onset    Heart Disease Mother     High Blood Pressure Mother     Diabetes Mother     High Blood Pressure Father           SOCIAL HISTORY       Social History     Socioeconomic History    Marital status: Single     Spouse name: None    Number of children: None    Years of education: None    Highest education level: None   Occupational History    None   Social Needs    Financial resource strain: None    Food insecurity     Worry: None     Inability: None    Transportation needs     Medical: None     Non-medical: None   Tobacco Use    Smoking status: Current Every Day Smoker     Packs/day: 0.50     Types: Cigarettes    Smokeless tobacco: Never Used    Tobacco comment: trying to quit   Substance and Sexual Activity    Alcohol use: No    Drug use: No    Sexual activity: Yes     Partners: Female   Lifestyle    Physical activity     Days per week: None     Minutes per session: None    Stress: None   Relationships    Social connections     Talks on phone: None     Gets together: None Attends Christian service: None     Active member of club or organization: None     Attends meetings of clubs or organizations: None     Relationship status: None    Intimate partner violence     Fear of current or ex partner: None     Emotionally abused: None     Physically abused: None     Forced sexual activity: None   Other Topics Concern    None   Social History Narrative    None       SCREENINGS             PHYSICAL EXAM    (up to 7 for level 4, 8 or more for level 5)     ED Triage Vitals [10/23/20 2228]   BP Temp Temp Source Pulse Resp SpO2 Height Weight   (!) 158/94 98.5 °F (36.9 °C) Oral 91 16 98 % 5' 10\" (1.778 m) 270 lb (122.5 kg)       Physical Exam  Vitals signs and nursing note reviewed. Constitutional:       General: He is in acute distress. Appearance: Normal appearance. He is well-developed. He is obese. He is not ill-appearing, toxic-appearing or diaphoretic. HENT:      Head: Normocephalic and atraumatic. Nose: Nose normal. No congestion or rhinorrhea. Mouth/Throat:      Mouth: Mucous membranes are moist.      Pharynx: Oropharynx is clear. No oropharyngeal exudate or posterior oropharyngeal erythema. Eyes:      General: No scleral icterus. Right eye: No discharge. Left eye: No discharge. Extraocular Movements: Extraocular movements intact. Conjunctiva/sclera: Conjunctivae normal.      Pupils: Pupils are equal, round, and reactive to light. Neck:      Musculoskeletal: Normal range of motion and neck supple. No neck rigidity or muscular tenderness. Thyroid: No thyromegaly. Vascular: No carotid bruit or JVD. Trachea: No tracheal deviation. Cardiovascular:      Rate and Rhythm: Normal rate and regular rhythm. Heart sounds: Normal heart sounds. No murmur. No friction rub. No gallop. Pulmonary:      Effort: Pulmonary effort is normal. No respiratory distress. Breath sounds: Normal breath sounds. No stridor.  No wheezing, rhonchi or rales. Chest:      Chest wall: No tenderness. Abdominal:      General: Bowel sounds are normal. There is no distension. Palpations: Abdomen is soft. There is no mass. Tenderness: There is no abdominal tenderness. There is no right CVA tenderness, left CVA tenderness, guarding or rebound. Hernia: No hernia is present. Musculoskeletal: Normal range of motion. General: No swelling, tenderness, deformity or signs of injury. Right lower leg: No edema. Left lower leg: No edema. Lymphadenopathy:      Cervical: No cervical adenopathy. Skin:     General: Skin is warm and dry. Capillary Refill: Capillary refill takes less than 2 seconds. Coloration: Skin is not jaundiced or pale. Findings: No bruising, erythema, lesion or rash. Neurological:      General: No focal deficit present. Mental Status: He is alert and oriented to person, place, and time. Cranial Nerves: No cranial nerve deficit. Sensory: No sensory deficit. Motor: No weakness or abnormal muscle tone. Coordination: Coordination normal.      Gait: Gait normal.      Deep Tendon Reflexes: Reflexes are normal and symmetric. Reflexes normal.   Psychiatric:         Behavior: Behavior normal.         Thought Content:  Thought content normal.         Judgment: Judgment normal.         DIAGNOSTIC RESULTS     EKG: All EKG's are interpreted by the Emergency Department Physician who either signs or Co-signs this chart in the absence of a cardiologist.        RADIOLOGY:   Non-plain film images such as CT, Ultrasound and MRI are read by the radiologist. Sumeet Handing radiographicimages are visualized and preliminarily interpreted by the emergency physician with the below findings:        Interpretation per the Radiologist below, if available at the time of this note:    No orders to display         ED BEDSIDE ULTRASOUND:   Performed by ED Physician - none    LABS:  Labs Reviewed - No data to display    All other labs were within normal range or not returned as of this dictation. EMERGENCY DEPARTMENT COURSE and DIFFERENTIALDIAGNOSIS/MDM:   Vitals:    Vitals:    10/23/20 2228   BP: (!) 158/94   Pulse: 91   Resp: 16   Temp: 98.5 °F (36.9 °C)   TempSrc: Oral   SpO2: 98%   Weight: 270 lb (122.5 kg)   Height: 5' 10\" (1.778 m)           MDM  Number of Diagnoses or Management Options  Acute exacerbation of chronic low back pain:      Amount and/or Complexity of Data Reviewed  Tests in the radiology section of CPT®: reviewed    Risk of Complications, Morbidity, and/or Mortality  Presenting problems: moderate  Diagnostic procedures: moderate  Management options: moderate    Patient Progress  Patient progress: improved      CRITICAL CARE TIME   Total Critical Care time was  minutes, excluding separately reportable procedures. There was a high probability of clinically significant/life threatening deterioration in the patient's condition which required my urgentintervention. CONSULTS:  None    PROCEDURES:  Unless otherwise noted below, none     Procedures    FINAL IMPRESSION      1. Acute exacerbation of chronic low back pain Stable         DISPOSITION/PLAN   DISPOSITION Decision To Discharge 10/23/2020 10:58:15 PM      PATIENT REFERRED TO:  Becki Hill MD  [place] [address] [facility] [address]  [phone]    In 3 days        DISCHARGE MEDICATIONS:  New Prescriptions    ORPHENADRINE (NORFLEX) 100 MG EXTENDED RELEASE TABLET    Take 1 tablet by mouth 2 times daily for 10 days    OXYCODONE-ACETAMINOPHEN (PERCOCET) 5-325 MG PER TABLET    Take 1 tablet by mouth every 6 hours as needed for Pain for up to 3 days. WARNING:  May cause drowsiness. May impair ability to operate vehicles or machinery. Do not use in combination with alcohol.           (Please note that portions of this note were completed with a voice recognitionprogram.  Efforts were made to edit the dictations but occasionally words are mis-transcribed.)    Celi Crespo MD (electronically signed)  Attending Emergency Physician          Celi Crespo MD  10/23/20 [address], [place]  10/23/20 [address], [place]  10/23/20 9269

## 2020-10-30 ENCOUNTER — HOSPITAL ENCOUNTER (EMERGENCY)
Age: 35
Discharge: HOME | End: 2020-10-30
Attending: EMERGENCY MEDICINE
Payer: MEDICARE

## 2020-10-30 VITALS
TEMPERATURE: 98.5 F | WEIGHT: 315 LBS | SYSTOLIC BLOOD PRESSURE: 174 MMHG | DIASTOLIC BLOOD PRESSURE: 88 MMHG | HEIGHT: 70 IN | RESPIRATION RATE: 18 BRPM | BODY MASS INDEX: 45.1 KG/M2 | OXYGEN SATURATION: 98 % | HEART RATE: 88 BPM

## 2020-10-30 PROCEDURE — 6370000000 HC RX 637 (ALT 250 FOR IP): Performed by: EMERGENCY MEDICINE

## 2020-10-30 PROCEDURE — 6360000002 HC RX W HCPCS: Performed by: EMERGENCY MEDICINE

## 2020-10-30 PROCEDURE — 96372 THER/PROPH/DIAG INJ SC/IM: CPT

## 2020-10-30 PROCEDURE — 99283 EMERGENCY DEPT VISIT LOW MDM: CPT

## 2020-10-30 RX ORDER — OXYCODONE HYDROCHLORIDE AND ACETAMINOPHEN 5; 325 MG/1; MG/1
1 TABLET ORAL EVERY 6 HOURS PRN
Qty: 8 TABLET | Refills: 0 | Status: SHIPPED | OUTPATIENT
Start: 2020-10-30 | End: 2020-11-02 | Stop reason: ALTCHOICE

## 2020-10-30 RX ORDER — CYCLOBENZAPRINE HCL 10 MG
10 TABLET ORAL 3 TIMES DAILY PRN
Qty: 20 TABLET | Refills: 0 | Status: SHIPPED | OUTPATIENT
Start: 2020-10-30 | End: 2020-11-02 | Stop reason: SDUPTHER

## 2020-10-30 RX ORDER — NAPROXEN 500 MG/1
500 TABLET ORAL 2 TIMES DAILY
Qty: 10 TABLET | Refills: 0 | Status: SHIPPED | OUTPATIENT
Start: 2020-10-30 | End: 2020-11-02 | Stop reason: ALTCHOICE

## 2020-10-30 RX ORDER — KETOROLAC TROMETHAMINE 30 MG/ML
30 INJECTION, SOLUTION INTRAMUSCULAR; INTRAVENOUS ONCE
Status: COMPLETED | OUTPATIENT
Start: 2020-10-30 | End: 2020-10-30

## 2020-10-30 RX ORDER — OXYCODONE HYDROCHLORIDE AND ACETAMINOPHEN 5; 325 MG/1; MG/1
1 TABLET ORAL ONCE
Status: COMPLETED | OUTPATIENT
Start: 2020-10-30 | End: 2020-10-30

## 2020-10-30 RX ADMIN — OXYCODONE HYDROCHLORIDE AND ACETAMINOPHEN 1 TABLET: 5; 325 TABLET ORAL at 21:12

## 2020-10-30 RX ADMIN — KETOROLAC TROMETHAMINE 30 MG: 30 INJECTION, SOLUTION INTRAMUSCULAR at 21:11

## 2020-10-30 ASSESSMENT — PAIN - FUNCTIONAL ASSESSMENT: PAIN_FUNCTIONAL_ASSESSMENT: PREVENTS OR INTERFERES SOME ACTIVE ACTIVITIES AND ADLS

## 2020-10-30 ASSESSMENT — PAIN DESCRIPTION - LOCATION: LOCATION: BACK

## 2020-10-30 ASSESSMENT — PAIN DESCRIPTION - DESCRIPTORS: DESCRIPTORS: ACHING

## 2020-10-30 ASSESSMENT — PAIN DESCRIPTION - ORIENTATION: ORIENTATION: LOWER;MID

## 2020-10-30 ASSESSMENT — PAIN SCALES - GENERAL
PAINLEVEL_OUTOF10: 8
PAINLEVEL_OUTOF10: 8

## 2020-10-30 ASSESSMENT — PAIN DESCRIPTION - PROGRESSION: CLINICAL_PROGRESSION: GRADUALLY WORSENING

## 2020-10-30 ASSESSMENT — PAIN DESCRIPTION - FREQUENCY: FREQUENCY: CONTINUOUS

## 2020-10-30 ASSESSMENT — PAIN DESCRIPTION - ONSET: ONSET: ON-GOING

## 2020-10-30 ASSESSMENT — PAIN DESCRIPTION - PAIN TYPE: TYPE: ACUTE PAIN

## 2020-10-30 NOTE — ED TRIAGE NOTES
Presents with back pain, non radiating. Lumbar area.  States cannot get into a doctor without referral

## 2020-10-31 NOTE — ED PROVIDER NOTES
CC/HPI: 56-year-old male to the emergency department chief complaint of lower back pain. Patient states approximately 4 months ago he fell landing directly on his lower back and tailbone. Patient states he was seen in the emergency department and had x-rays which were negative. Patient states he was scheduled for an MRI secondary to persistent pain however he could not get it approved through [place]. Patient states he was scheduled to see a new family physician Dr. Alberto Olivo on the ninth. Patient states that he has been doing some work around the house and his back flared up. No specific new injury no fever no chills no nausea no vomiting no urinary symptoms no changes to bowel movements. VITALS/PMH/PSH: Reviewed per nurses notes    REVIEW OF SYSTEMS: As in chief complaint history of present illness, otherwise all other systems are reviewed and negative the total 10 systems reviewed    GEN: Pt alert and oriented, no acute distress  HEENT:         Normocephalic/Atramatic        PERRL, EOMI       Throat nonerythematous or edematous. No exudates noted. Moist membranes  NECK: Nontender, no signs of trauma, no lymphadenopathy  HEART: Reg S1/S2, without murmer, rub or gallop  LUNGS: Clear to auscultation bilaterally, respirations even and unlabored  ABDOMEN: soft, nontender, nondistended. No guarding rebound or rigidity  MUSCULOSKELETAL/EXTREMITITES:  No signs of trauma, cyanosis or edema. No CVA tenderness. No redness warmth or signs of infection. Patient with tenderness midline mid to lower lumbar spine and also bilateral paraspinal musculature and also into the right secure iliac area. Patient neurovascularly intact with equal pulses bilaterally. No calf swelling or discomfort with palpation  LYMPH: no peripheral lympadenopathy noted  SKIN:  Warm & dry, no rash  NEUROLOGIC:  Alert and oriented.   Speech clear    Medical decision making/ED course;  Patient remained stable throughout the course of his emergency department stay. Patient was given an IM injection of Toradol and a p.o. Percocet while in the emergency department. Clinical impression;  Acute exacerbation of chronic lower back pain. Disposition/plan;  Patient been discharged home in stable and improved condition given discharge instructions on back pain. Patient is to keep his upcoming appointment with family practice. Patient is to return for any worsening or changes to symptoms.      Javier Cummings DO  10/30/20 9640

## 2020-11-02 ENCOUNTER — HOSPITAL ENCOUNTER (EMERGENCY)
Age: 35
Discharge: HOME | End: 2020-11-02
Attending: EMERGENCY MEDICINE
Payer: MEDICARE

## 2020-11-02 VITALS
HEART RATE: 98 BPM | SYSTOLIC BLOOD PRESSURE: 143 MMHG | HEIGHT: 70 IN | DIASTOLIC BLOOD PRESSURE: 92 MMHG | TEMPERATURE: 98.1 F | WEIGHT: 270 LBS | BODY MASS INDEX: 38.65 KG/M2 | RESPIRATION RATE: 18 BRPM | OXYGEN SATURATION: 100 %

## 2020-11-02 PROCEDURE — 6370000000 HC RX 637 (ALT 250 FOR IP): Performed by: EMERGENCY MEDICINE

## 2020-11-02 PROCEDURE — 99283 EMERGENCY DEPT VISIT LOW MDM: CPT

## 2020-11-02 RX ORDER — CYCLOBENZAPRINE HCL 10 MG
10 TABLET ORAL ONCE
Status: COMPLETED | OUTPATIENT
Start: 2020-11-02 | End: 2020-11-02

## 2020-11-02 RX ORDER — LIDOCAINE 4 G/G
1 PATCH TOPICAL ONCE
Status: DISCONTINUED | OUTPATIENT
Start: 2020-11-02 | End: 2020-11-02 | Stop reason: HOSPADM

## 2020-11-02 RX ORDER — KETOROLAC TROMETHAMINE 10 MG/1
10 TABLET, FILM COATED ORAL ONCE
Status: COMPLETED | OUTPATIENT
Start: 2020-11-02 | End: 2020-11-02

## 2020-11-02 RX ORDER — LIDOCAINE 50 MG/G
1 PATCH TOPICAL EVERY 24 HOURS
Qty: 30 PATCH | Refills: 0 | Status: SHIPPED | OUTPATIENT
Start: 2020-11-02 | End: 2020-12-02

## 2020-11-02 RX ORDER — KETOROLAC TROMETHAMINE 10 MG/1
10 TABLET, FILM COATED ORAL EVERY 8 HOURS PRN
Qty: 6 TABLET | Refills: 0 | Status: SHIPPED | OUTPATIENT
Start: 2020-11-02 | End: 2021-01-24

## 2020-11-02 RX ORDER — CYCLOBENZAPRINE HCL 10 MG
10 TABLET ORAL 3 TIMES DAILY PRN
Qty: 20 TABLET | Refills: 0 | Status: SHIPPED | OUTPATIENT
Start: 2020-11-02 | End: 2020-11-09

## 2020-11-02 RX ADMIN — KETOROLAC TROMETHAMINE 10 MG: 10 TABLET, FILM COATED ORAL at 20:08

## 2020-11-02 RX ADMIN — CYCLOBENZAPRINE HYDROCHLORIDE 10 MG: 10 TABLET, FILM COATED ORAL at 20:08

## 2020-11-02 ASSESSMENT — PAIN SCALES - GENERAL: PAINLEVEL_OUTOF10: 8

## 2020-11-02 ASSESSMENT — PAIN DESCRIPTION - LOCATION: LOCATION: BACK

## 2020-11-02 ASSESSMENT — PAIN DESCRIPTION - PAIN TYPE: TYPE: ACUTE PAIN

## 2020-11-02 ASSESSMENT — PAIN DESCRIPTION - ONSET: ONSET: ON-GOING

## 2020-11-02 ASSESSMENT — PAIN DESCRIPTION - ORIENTATION: ORIENTATION: LOWER

## 2020-11-02 ASSESSMENT — PAIN DESCRIPTION - FREQUENCY: FREQUENCY: INTERMITTENT

## 2020-11-02 ASSESSMENT — PAIN DESCRIPTION - DESCRIPTORS: DESCRIPTORS: SHARP

## 2020-11-03 ENCOUNTER — HOSPITAL ENCOUNTER (EMERGENCY)
Age: 35
Discharge: HOME | End: 2020-11-03
Attending: EMERGENCY MEDICINE
Payer: MEDICARE

## 2020-11-03 VITALS
HEART RATE: 99 BPM | WEIGHT: 270 LBS | DIASTOLIC BLOOD PRESSURE: 88 MMHG | BODY MASS INDEX: 38.65 KG/M2 | TEMPERATURE: 97.6 F | OXYGEN SATURATION: 97 % | SYSTOLIC BLOOD PRESSURE: 166 MMHG | HEIGHT: 70 IN | RESPIRATION RATE: 20 BRPM

## 2020-11-03 PROCEDURE — 99283 EMERGENCY DEPT VISIT LOW MDM: CPT

## 2020-11-03 PROCEDURE — 6370000000 HC RX 637 (ALT 250 FOR IP): Performed by: EMERGENCY MEDICINE

## 2020-11-03 RX ORDER — HYDROCODONE BITARTRATE AND ACETAMINOPHEN 5; 325 MG/1; MG/1
1 TABLET ORAL ONCE
Status: COMPLETED | OUTPATIENT
Start: 2020-11-03 | End: 2020-11-03

## 2020-11-03 RX ADMIN — HYDROCODONE BITARTRATE AND ACETAMINOPHEN 1 TABLET: 5; 325 TABLET ORAL at 20:23

## 2020-11-03 ASSESSMENT — PAIN DESCRIPTION - DESCRIPTORS: DESCRIPTORS: CONSTANT

## 2020-11-03 ASSESSMENT — PAIN SCALES - GENERAL
PAINLEVEL_OUTOF10: 9
PAINLEVEL_OUTOF10: 9

## 2020-11-03 ASSESSMENT — PAIN DESCRIPTION - PAIN TYPE: TYPE: CHRONIC PAIN

## 2020-11-03 ASSESSMENT — PAIN DESCRIPTION - LOCATION: LOCATION: BACK

## 2020-11-03 ASSESSMENT — PAIN DESCRIPTION - ORIENTATION: ORIENTATION: LOWER;LEFT

## 2020-11-03 NOTE — ED PROVIDER NOTES
eMERGENCY dEPARTMENT eNCOUnter      [facility]    Chief Complaint   Patient presents with    Back Pain     lower back pain numbness going down his left leg       HPI    Maggie Wick is a 38 y.o. male who presentsto ED from home  By private car  With complaint of lower back pain  Onset 4 days  Intensity of symptoms moderate  Patient has got a history of lower back pain and he was working on the radiator when he started having worsening of his back pain. Pain is moderate in intensity and radiates to the left lower extremity. Patient denies any bowel bladder incontinence or weakness of the lower extremity. Patient is able to ambulate with complaints of pain with ambulation. Patient is a diabetic and does not want any steroid. PAST MEDICAL HISTORY    Past Medical History:   Diagnosis Date    Chronic back pain     Diabetes mellitus ([address]     Diabetes mellitus ([facility] [address]     Hypertension     Hypertension        SURGICAL HISTORY    Past Surgical History:   Procedure Laterality Date    HERNIA REPAIR      MOUTH SURGERY      TONSILLECTOMY AND ADENOIDECTOMY         CURRENT MEDICATIONS    Current Outpatient Rx   Medication Sig Dispense Refill    cyclobenzaprine (FLEXERIL) 10 MG tablet Take 1 tablet by mouth 3 times daily as needed for Muscle spasms 20 tablet 0    lidocaine (LIDODERM) 5 % Place 1 patch onto the skin every 24 hours Place 1 patch onto the skin daily 12 hours on, 12 hours off.  30 patch 0    ketorolac (TORADOL) 10 MG tablet Take 1 tablet by mouth every 8 hours as needed for Pain 6 tablet 0    insulin glargine (LANTUS) 100 UNIT/ML injection vial Inject into the skin nightly      Insulin Aspart (NOVOLOG SC) Inject into the skin      melatonin (RA MELATONIN) 3 MG TABS tablet Take 1 tablet by mouth nightly as needed (insomnia) 15 tablet 3    glipiZIDE (GLUCOTROL) 5 MG tablet Take 1 tablet by mouth 2 times daily (before meals) 60 tablet 0    metFORMIN (GLUCOPHAGE-XR) 500 MG extended release tablet Take 1 tablet by mouth 2 times daily 60 tablet 0    metoprolol tartrate (LOPRESSOR) 50 MG tablet Take 1 tablet by mouth 2 times daily 60 tablet 0    FREESTYLE LITE strip 1 each by Does not apply route 3 times daily 500 each 5    Blood Glucose Monitoring Suppl (FREESTYLE LITE) NICHOLAS U UTD TO TEST 5-6 TIMES D  0    Blood Pressure Monitoring (BLOOD PRESSURE KIT) NICHOLAS Dispense 1 machine with large cuff, diagnosis hypertension and diabetes. Patient is to take blood pressure 2x a day and record.  1 Device 0       ALLERGIES    Allergies   Allergen Reactions    Buspar [Buspirone] Anaphylaxis    Risperidone Shortness Of Breath    Clindamycin/Lincomycin     Tramadol Nausea Only    Ketorolac Nausea And Vomiting     vomiting       FAMILY HISTORY    Family History   Problem Relation Age of Onset    Heart Disease Mother     High Blood Pressure Mother     Diabetes Mother     High Blood Pressure Father        SOCIAL HISTORY    Social History     Socioeconomic History    Marital status: Single     Spouse name: None    Number of children: None    Years of education: None    Highest education level: None   Occupational History    None   Social Needs    Financial resource strain: None    Food insecurity     Worry: None     Inability: None    Transportation needs     Medical: None     Non-medical: None   Tobacco Use    Smoking status: Current Every Day Smoker     Packs/day: 0.50     Types: Cigarettes    Smokeless tobacco: Never Used    Tobacco comment: trying to quit   Substance and Sexual Activity    Alcohol use: No    Drug use: No    Sexual activity: Yes     Partners: Female   Lifestyle    Physical activity     Days per week: None     Minutes per session: None    Stress: None   Relationships    Social connections     Talks on phone: None     Gets together: None     Attends [employer] service: None     Active member of club or organization: None     Attends meetings of clubs or organizations: None Relationship status: None    Intimate partner violence     Fear of current or ex partner: None     Emotionally abused: None     Physically abused: None     Forced sexual activity: None   Other Topics Concern    None   Social History Narrative    None       REVIEW OF SYSTEMS    Constitutional:  Denies fever, chills, weight loss or weakness   Eyes:  Denies photophobia or discharge   HENT:  Denies sore throat or ear pain   Respiratory:  Denies cough or shortness of breath   Cardiovascular:  Denies chest pain, palpitations or swelling   GI:  Denies abdominal pain, nausea, vomiting, or diarrhea   Musculoskeletal: Complains of lower back pain   Skin:  Denies rash   Neurologic:  Denies headache, focal weakness or sensory changes   Endocrine:  Denies polyuria or polydypsia   Lymphatic:  Denies swollen glands   Psychiatric:  Denies depression, suicidal ideation or homicidal ideation   All systems negative except as marked. PHYSICAL EXAM    VITAL SIGNS: BP (!) 143/92   Pulse 98   Temp 98.1 °F (36.7 °C) (Oral)   Resp 18   Ht 5' 10\" (1.778 m)   Wt 270 lb (122.5 kg)   SpO2 100%   BMI 38.74 kg/m²    Constitutional: Obese, mild acute distress, Non-toxic appearance. HENT:  Normocephalic, Atraumatic, Bilateral external ears normal, Oropharynx moist, No oral exudates, Nose normal. Neck- Normal range of motion, No tenderness, Supple, No stridor. Eyes:  PERRL, EOMI, Conjunctiva normal, No discharge. Respiratory:  Normal breath sounds, No respiratory distress, No wheezing, No chest tenderness. Cardiovascular:  Normal heart rate, Normal rhythm, No murmurs, No rubs, No gallops. GI:  Bowel sounds normal, Soft, No tenderness, No masses, No pulsatile masses. :No CVA tenderness. Musculoskeletal:  Intact distal pulses, No edema, No tenderness, No cyanosis, No clubbing. Good range of motion in all major joints. No tenderness to palpation or major deformities noted. Back-lower lumbar tenderness.   SLR positive on the

## 2020-11-03 NOTE — ED NOTES
Reports low back pain. States has been going on [employer]". Denies radiating pain. MSP's intact. Denies loss of bowel/bladder function.       Milana Dunn RN  11/02/20 2005

## 2020-11-04 NOTE — ED PROVIDER NOTES
60 tablet 0    metoprolol tartrate (LOPRESSOR) 50 MG tablet Take 1 tablet by mouth 2 times daily 60 tablet 0    FREESTYLE LITE strip 1 each by Does not apply route 3 times daily 500 each 5    Blood Glucose Monitoring Suppl (FREESTYLE LITE) NICHOLAS U UTD TO TEST 5-6 TIMES D  0    Blood Pressure Monitoring (BLOOD PRESSURE KIT) NICHOLAS Dispense 1 machine with large cuff, diagnosis hypertension and diabetes. Patient is to take blood pressure 2x a day and record.  1 Device 0       ALLERGIES    Allergies   Allergen Reactions    Buspar [Buspirone] Anaphylaxis    Risperidone Shortness Of Breath    Clindamycin/Lincomycin     Tramadol Nausea Only    Ketorolac Nausea And Vomiting     vomiting       FAMILY HISTORY    Family History   Problem Relation Age of Onset    Heart Disease Mother     High Blood Pressure Mother     Diabetes Mother     High Blood Pressure Father        SOCIAL HISTORY    Social History     Socioeconomic History    Marital status: Single     Spouse name: None    Number of children: None    Years of education: None    Highest education level: None   Occupational History    None   Social Needs    Financial resource strain: None    Food insecurity     Worry: None     Inability: None    Transportation needs     Medical: None     Non-medical: None   Tobacco Use    Smoking status: Current Every Day Smoker     Packs/day: 0.50     Types: Cigarettes    Smokeless tobacco: Never Used    Tobacco comment: trying to quit   Substance and Sexual Activity    Alcohol use: No    Drug use: No    Sexual activity: Yes     Partners: Female   Lifestyle    Physical activity     Days per week: None     Minutes per session: None    Stress: None   Relationships    Social connections     Talks on phone: None     Gets together: None     Attends Episcopal service: None     Active member of club or organization: None     Attends meetings of clubs or organizations: None     Relationship status: None    Intimate partner violence     Fear of current or ex partner: None     Emotionally abused: None     Physically abused: None     Forced sexual activity: None   Other Topics Concern    None   Social History Narrative    None       REVIEW OF SYSTEMS    Constitutional:  Denies fever, chills, weight loss or weakness   Eyes:  Denies photophobia or discharge   HENT:  Denies sore throat or ear pain   Respiratory:  Denies cough or shortness of breath   Cardiovascular:  Denies chest pain, palpitations or swelling   GI:  Denies abdominal pain, nausea, vomiting, or diarrhea   Musculoskeletal:  C/o low back pain   Skin:  Denies rash   Neurologic:  Denies headache, focal weakness or sensory changes   Endocrine:  Denies polyuria or polydypsia   Lymphatic:  Denies swollen glands   Psychiatric:  Denies depression, suicidal ideation or homicidal ideation   All systems negative except as marked. PHYSICAL EXAM    VITAL SIGNS: BP (!) 166/88   Pulse 99   Temp 97.6 °F (36.4 °C) (Temporal)   Resp 20   Ht 5' 10\" (1.778 m)   Wt 270 lb (122.5 kg)   SpO2 97%   BMI 38.74 kg/m²    Constitutional:  Well developed, Well nourished, mild acute distress, Non-toxic appearance. HENT:  Normocephalic, Atraumatic, Bilateral external ears normal, Oropharynx moist, No oral exudates, Nose normal. Neck- Normal range of motion, No tenderness, Supple, No stridor. Eyes:  PERRL, EOMI, Conjunctiva normal, No discharge. Respiratory:  Normal breath sounds, No respiratory distress, No wheezing, No chest tenderness. Cardiovascular:  Normal heart rate, Normal rhythm, No murmurs, No rubs, No gallops. GI:  Bowel sounds normal, Soft, No tenderness, No masses, No pulsatile masses. :  No CVA tenderness. Musculoskeletal:  Intact distal pulses, No edema, No tenderness, No cyanosis, No clubbing. Good range of motion in all major joints. No tenderness to palpation or major deformities noted. Back-lower lumbar tenderness.   SLR positive on the left side, reflex in the lower extremity normal  Integument:  Warm, Dry, No erythema, No rash. Lymphatic:  No lymphadenopathy noted. Neurologic:  Alert & oriented x 3, Normal motor function, Normal sensory function, No focal deficits noted. Psychiatric:  Affect normal, Judgment normal, Mood normal.       REEVALUATION   Pain control adequate. Patient is ambulating. Summation      Patient Course:     ED Medications administered this visit:    Medications   HYDROcodone-acetaminophen (NORCO) 5-325 MG per tablet 1 tablet (has no administration in time range)       New Prescriptions from this visit:    New Prescriptions    No medications on file       Follow-up:  Humberto Augustine MD  [address], [facility] [address]  [phone]    Schedule an appointment as soon as possible for a visit in 3 days  Follow up within 3 days, Return to ED sooner if symptoms worsen        Final Impression:   1.  Acute left-sided back pain with sciatica               (Please note that portions of this note were completed with a voice recognition program.  Efforts were made to edit the dictations but occasionally words are mis-transcribed.)          Julianna Nur MD  11/03/20 2027

## 2020-11-04 NOTE — ED NOTES
Asked patient if he took flexeril today and states he has taken it twice but its not working, pt states lidoderm patches don't help, and hasnt used the Billya Mathias Moritz 723, RN  11/03/20 2006

## 2020-11-08 ENCOUNTER — HOSPITAL ENCOUNTER (EMERGENCY)
Age: 35
Discharge: HOME | End: 2020-11-08
Attending: EMERGENCY MEDICINE
Payer: MEDICARE

## 2020-11-08 VITALS
SYSTOLIC BLOOD PRESSURE: 170 MMHG | HEIGHT: 70 IN | OXYGEN SATURATION: 95 % | RESPIRATION RATE: 20 BRPM | DIASTOLIC BLOOD PRESSURE: 94 MMHG | BODY MASS INDEX: 38.8 KG/M2 | TEMPERATURE: 98.5 F | HEART RATE: 130 BPM | WEIGHT: 271 LBS

## 2020-11-08 PROCEDURE — 6370000000 HC RX 637 (ALT 250 FOR IP): Performed by: EMERGENCY MEDICINE

## 2020-11-08 PROCEDURE — 99284 EMERGENCY DEPT VISIT MOD MDM: CPT

## 2020-11-08 PROCEDURE — 6360000002 HC RX W HCPCS: Performed by: EMERGENCY MEDICINE

## 2020-11-08 PROCEDURE — 96372 THER/PROPH/DIAG INJ SC/IM: CPT

## 2020-11-08 RX ORDER — MELOXICAM 7.5 MG/1
7.5 TABLET ORAL DAILY
Qty: 14 TABLET | Refills: 0 | Status: SHIPPED | OUTPATIENT
Start: 2020-11-08 | End: 2021-01-24

## 2020-11-08 RX ORDER — CYCLOBENZAPRINE HCL 10 MG
10 TABLET ORAL 3 TIMES DAILY PRN
Qty: 20 TABLET | Refills: 0 | Status: SHIPPED | OUTPATIENT
Start: 2020-11-08 | End: 2020-11-18

## 2020-11-08 RX ORDER — HYDROCODONE BITARTRATE AND ACETAMINOPHEN 5; 325 MG/1; MG/1
1 TABLET ORAL ONCE
Status: COMPLETED | OUTPATIENT
Start: 2020-11-08 | End: 2020-11-08

## 2020-11-08 RX ORDER — ORPHENADRINE CITRATE 30 MG/ML
60 INJECTION INTRAMUSCULAR; INTRAVENOUS ONCE
Status: COMPLETED | OUTPATIENT
Start: 2020-11-08 | End: 2020-11-08

## 2020-11-08 RX ADMIN — ORPHENADRINE CITRATE 60 MG: 30 INJECTION INTRAMUSCULAR; INTRAVENOUS at 13:23

## 2020-11-08 RX ADMIN — HYDROCODONE BITARTRATE AND ACETAMINOPHEN 1 TABLET: 5; 325 TABLET ORAL at 13:23

## 2020-11-08 ASSESSMENT — PAIN DESCRIPTION - FREQUENCY: FREQUENCY: CONTINUOUS

## 2020-11-08 ASSESSMENT — PAIN DESCRIPTION - ORIENTATION: ORIENTATION: LOWER

## 2020-11-08 ASSESSMENT — PAIN SCALES - GENERAL
PAINLEVEL_OUTOF10: 6
PAINLEVEL_OUTOF10: 8

## 2020-11-08 ASSESSMENT — PAIN DESCRIPTION - DESCRIPTORS: DESCRIPTORS: ACHING

## 2020-11-08 ASSESSMENT — PAIN DESCRIPTION - PAIN TYPE: TYPE: ACUTE PAIN;CHRONIC PAIN

## 2020-11-08 ASSESSMENT — PAIN DESCRIPTION - LOCATION: LOCATION: BACK

## 2020-11-08 NOTE — ED TRIAGE NOTES
Patient presents to ED with c/o low back pain that is chronic and suspects he aggrrevated it the other day and has had pain increased pain since

## 2020-11-08 NOTE — ED PROVIDER NOTES
CC/HPI: 75-year-old male to the emergency department chief complaint of low back pain. Patient states that he has had this pain off and on for several months. Patient was referred to pain management but states the medications he was on was not helping him and also he was supposed to get an MRI which was not approved because he did not have a primary care physician. Patient states he has an appointment on Wednesday with Dr. Yoselyn Moyer. Patient states that it was supposed to be tomorrow however it was pushed back. Patient denies any new or unusual symptoms. He has pain radiating down his left buttocks and posterior thigh. Occasional numbness and tingling down his leg. No new injury. No urinary symptoms or changes to bowel movements. VITALS/PMH/PSH: Reviewed per nurses notes    REVIEW OF SYSTEMS: As in chief complaint history of present illness, otherwise all other systems are reviewed and negative the total 10 systems reviewed    GEN: Pt alert and oriented, no acute distress  HEENT:         Normocephalic/Atramatic        PERRL, EOMI       Throat nonerythematous or edematous. No exudates noted. Moist membranes  NECK: Nontender, no signs of trauma, no lymphadenopathy  HEART: Reg S1/S2, without murmer, rub or gallop  LUNGS: Clear to auscultation bilaterally, respirations even and unlabored  ABDOMEN: Bowel sounds positive times 4, soft, nontender, nondistended. No guarding rebound or rigidity  MUSCULOSKELETAL/EXTREMITITES:  No signs of trauma, cyanosis or edema. No CVA tenderness  Examination of the lower back shows no redness warmth or signs of infection. There is no swelling or obvious spasm. Patient diffusely tender mid to lower lumbar spine both midline and paraspinal musculature. Patient tender into left sacroiliac joint and sciatic area. Patient neurovascularly intact distally with brisk capillary refill strong equal pulses.   LYMPH: no peripheral lympadenopathy noted  SKIN:  Warm & dry, no rash  NEUROLOGIC:  Alert and oriented. Speech clear    Medical decision making/ED course;  Patient remained stable throughout the course of his emergency department stay. Patient was given an IM injection of Norflex and 1 p.o. Dexter Mcclain was reviewed and reviewed with the patient. It is concerning because patient has had 10 prescriptions for narcotics in the last approximately 4 months from numerous different emergency department providers. Discussed with the patient we will give him a dose of Norco while in the emergency department but will not be able to prescribe Norco to go home. Clinical impression;  1) chronic low back pain with sciatica    Disposition/plan;  Patient being discharged home in stable condition. Given discharge instructions on sciatica. Encouraged to follow-up with upcoming family practice visit and also encouraged to return to pain management. Encouraged to return to the emergency department for any worsening or changes to symptoms or any bowel or bladder complaints. Discussed that we will not be able to prescribe narcotics at this time given recent history of multiple prescriptions from multiple providers.      Javier Cummings DO  11/08/20 7032

## 2020-11-22 ENCOUNTER — HOSPITAL ENCOUNTER (EMERGENCY)
Age: 35
Discharge: LEFT BEFORE DISCHARGE | End: 2020-11-22
Attending: EMERGENCY MEDICINE
Payer: MEDICARE

## 2020-11-22 VITALS
SYSTOLIC BLOOD PRESSURE: 162 MMHG | DIASTOLIC BLOOD PRESSURE: 82 MMHG | RESPIRATION RATE: 16 BRPM | OXYGEN SATURATION: 96 % | HEIGHT: 70 IN | BODY MASS INDEX: 39.8 KG/M2 | HEART RATE: 82 BPM | TEMPERATURE: 98.3 F | WEIGHT: 278 LBS

## 2020-11-22 PROCEDURE — 4500000002 HC ER NO CHARGE

## 2020-11-22 RX ORDER — POLYETHYLENE GLYCOL 3350 17 G/17G
17 POWDER, FOR SOLUTION ORAL DAILY PRN
Qty: 527 G | Refills: 0 | Status: SHIPPED | OUTPATIENT
Start: 2020-11-22 | End: 2020-12-22

## 2020-11-22 RX ORDER — SODIUM PHOSPHATE, DIBASIC AND SODIUM PHOSPHATE, MONOBASIC 7; 19 G/133ML; G/133ML
1 ENEMA RECTAL
Qty: 1 BOTTLE | Refills: 0 | Status: SHIPPED | OUTPATIENT
Start: 2020-11-22 | End: 2020-11-22

## 2020-11-22 RX ORDER — MAGNESIUM CARB/ALUMINUM HYDROX 105-160MG
150 TABLET,CHEWABLE ORAL 2 TIMES DAILY PRN
Qty: 2 BOTTLE | Refills: 0 | Status: SHIPPED | OUTPATIENT
Start: 2020-11-22 | End: 2020-11-24

## 2020-11-22 ASSESSMENT — PAIN DESCRIPTION - DESCRIPTORS: DESCRIPTORS: PRESSURE;CRAMPING;SHARP

## 2020-11-22 ASSESSMENT — PAIN SCALES - GENERAL: PAINLEVEL_OUTOF10: 6

## 2020-11-22 ASSESSMENT — PAIN DESCRIPTION - LOCATION: LOCATION: ABDOMEN

## 2020-11-22 NOTE — ED PROVIDER NOTES
eMERGENCY dEPARTMENT eNCOUnter      [address] [facility]    Chief Complaint   Patient presents with    Constipation       HPI    Shawna Enciso is a 38 y.o. male who presentsto ED from home  By private car with Hx of HTN , Chronic back pain on narcotics, DM  With complaint of Constipation   Onset 3 days  Intensity of symptoms moderate  Patient takes Percocet for back pain and has been constipated for the last 3 days  He tried OTC medications with minimal relief. He denies fever, chills, SOB, Chest Pain      PAST MEDICAL HISTORY    Past Medical History:   Diagnosis Date    Chronic back pain     Diabetes mellitus ([address]     Diabetes mellitus ([address]     Hypertension     Hypertension        SURGICAL HISTORY    Past Surgical History:   Procedure Laterality Date    HERNIA REPAIR      MOUTH SURGERY      TONSILLECTOMY AND ADENOIDECTOMY         CURRENT MEDICATIONS    Current Outpatient Rx   Medication Sig Dispense Refill    Magnesium Citrate 1.745 GM/30ML solution Take 150 mLs by mouth 2 times daily as needed for Constipation (Take 150 ml by mouth x1 when necessary for constipation. May repeat this x1 one again at no results in 4 hours.) 2 Bottle 0    polyethylene glycol (GLYCOLAX) 17 g packet Take 17 g by mouth daily as needed for Constipation 527 g 0    Sodium Phosphates (FLEET) 7-19 GM/118ML Place 1 enema rectally once as needed (Constipation) 1 Bottle 0    meloxicam (MOBIC) 7.5 MG tablet Take 1 tablet by mouth daily for 14 days 14 tablet 0    lidocaine (LIDODERM) 5 % Place 1 patch onto the skin every 24 hours Place 1 patch onto the skin daily 12 hours on, 12 hours off.  30 patch 0    ketorolac (TORADOL) 10 MG tablet Take 1 tablet by mouth every 8 hours as needed for Pain 6 tablet 0    insulin glargine (LANTUS) 100 UNIT/ML injection vial Inject into the skin nightly      Insulin Aspart (NOVOLOG SC) Inject into the skin      melatonin (RA MELATONIN) 3 MG TABS tablet Take 1 tablet by mouth nightly as needed (insomnia) 15 tablet 3    glipiZIDE (GLUCOTROL) 5 MG tablet Take 1 tablet by mouth 2 times daily (before meals) 60 tablet 0    metFORMIN (GLUCOPHAGE-XR) 500 MG extended release tablet Take 1 tablet by mouth 2 times daily 60 tablet 0    metoprolol tartrate (LOPRESSOR) 50 MG tablet Take 1 tablet by mouth 2 times daily 60 tablet 0    FREESTYLE LITE strip 1 each by Does not apply route 3 times daily 500 each 5    Blood Glucose Monitoring Suppl (FREESTYLE LITE) NICHOLAS U UTD TO TEST 5-6 TIMES D  0    Blood Pressure Monitoring (BLOOD PRESSURE KIT) NICHOLAS Dispense 1 machine with large cuff, diagnosis hypertension and diabetes. Patient is to take blood pressure 2x a day and record.  1 Device 0       ALLERGIES    Allergies   Allergen Reactions    Buspar [Buspirone] Anaphylaxis    Risperidone Shortness Of Breath    Clindamycin/Lincomycin     Tramadol Nausea Only    Ketorolac Nausea And Vomiting     vomiting       FAMILY HISTORY    Family History   Problem Relation Age of Onset    Heart Disease Mother     High Blood Pressure Mother     Diabetes Mother     High Blood Pressure Father        SOCIAL HISTORY    Social History     Socioeconomic History    Marital status: Single     Spouse name: None    Number of children: None    Years of education: None    Highest education level: None   Occupational History    None   Social Needs    Financial resource strain: None    Food insecurity     Worry: None     Inability: None    Transportation needs     Medical: None     Non-medical: None   Tobacco Use    Smoking status: Current Every Day Smoker     Packs/day: 1.00     Types: Cigarettes    Smokeless tobacco: Never Used    Tobacco comment: trying to quit   Substance and Sexual Activity    Alcohol use: No    Drug use: No    Sexual activity: Yes     Partners: Female   Lifestyle    Physical activity     Days per week: None     Minutes per session: None    Stress: None   Relationships    Social connections Talks on phone: None     Gets together: None     Attends [employer] service: None     Active member of club or organization: None     Attends meetings of clubs or organizations: None     Relationship status: None    Intimate partner violence     Fear of current or ex partner: None     Emotionally abused: None     Physically abused: None     Forced sexual activity: None   Other Topics Concern    None   Social History Narrative    None       REVIEW OF SYSTEMS    Constitutional:  Denies fever, chills, weight loss or weakness   Eyes:  Denies photophobia or discharge   HENT:  Denies sore throat or ear pain   Respiratory:  Denies cough or shortness of breath   Cardiovascular:  Denies chest pain, palpitations or swelling   GI: c/o abdominal cramping,and constipation but denies  nausea, vomiting  Musculoskeletal:  Denies back pain   Skin:  Denies rash   Neurologic:  Denies headache, focal weakness or sensory changes   Endocrine:  Denies polyuria or polydypsia   Lymphatic:  Denies swollen glands   Psychiatric:  Denies depression, suicidal ideation or homicidal ideation   All systems negative except as marked. PHYSICAL EXAM    VITAL SIGNS: BP (!) 162/82   Pulse 82   Temp 98.3 °F (36.8 °C) (Oral)   Resp 16   Ht 5' 10\" (1.778 m)   Wt 278 lb (126.1 kg)   SpO2 96%   BMI 39.89 kg/m²    Constitutional:   No acute distress, Non-toxic appearance. HENT:  Normocephalic, Atraumatic, Bilateral external ears normal, Oropharynx moist, No oral exudates, Nose normal. Neck- Normal range of motion, No tenderness, Supple, No stridor. Eyes:  PERRL, EOMI, Conjunctiva normal, No discharge. Respiratory:  Normal breath sounds, No respiratory distress, No wheezing, No chest tenderness. Cardiovascular:  Normal heart rate, Normal rhythm, No murmurs, No rubs, No gallops. GI:  Morbidly obese , Bowel sounds normal, Soft, No tenderness, No masses, No pulsatile masses. No rebound or rigidity  :  No CVA tenderness.    Musculoskeletal: Intact distal pulses, No edema, No tenderness, No cyanosis, No clubbing. Good range of motion in all major joints. No tenderness to palpation or major deformities noted. Back- No tenderness. Integument:  Warm, Dry, No erythema, No rash. Lymphatic:  No lymphadenopathy noted. Neurologic:  Alert & oriented x 3, Normal motor function, Normal sensory function, No focal deficits noted. Psychiatric:  Affect normal, Judgment normal, Mood normal.         RADIOLOGY    XR ABDOMEN (KUB) (SINGLE AP VIEW)    (Results Pending)           Summation      Patient Course:     ED Medications administered this visit:  Medications - No data to display    New Prescriptions from this visit:    New Prescriptions    MAGNESIUM CITRATE 1.745 GM/30ML SOLUTION    Take 150 mLs by mouth 2 times daily as needed for Constipation (Take 150 ml by mouth x1 when necessary for constipation. May repeat this x1 one again at no results in 4 hours.)    POLYETHYLENE GLYCOL (GLYCOLAX) 17 G PACKET    Take 17 g by mouth daily as needed for Constipation    SODIUM PHOSPHATES (FLEET) 7-19 GM/118ML    Place 1 enema rectally once as needed (Constipation)       Follow-up:  Duyen Roper MD  [address] [place] [address] [phone]    Call in 1 day          Final Impression:   1.  Constipation, unspecified constipation type               (Please note that portions of this note were completed with a voice recognition program.  Efforts were made to edit the dictations but occasionally words are mis-transcribed.)          Mckenzie Tracey MD  11/22/20 0157       Mckenzie Tracey MD  11/22/20 0029

## 2020-11-22 NOTE — ED TRIAGE NOTES
Constipation for two or three days. Patient states he was put on percocet for back pain and became constipated. A/0 x3, ambulatory, resps even and unlabored on room air.

## 2020-12-27 ENCOUNTER — HOSPITAL ENCOUNTER (EMERGENCY)
Age: 35
Discharge: HOME | End: 2020-12-27
Attending: EMERGENCY MEDICINE
Payer: MEDICARE

## 2020-12-27 VITALS
WEIGHT: 275 LBS | RESPIRATION RATE: 18 BRPM | DIASTOLIC BLOOD PRESSURE: 85 MMHG | HEIGHT: 70 IN | SYSTOLIC BLOOD PRESSURE: 143 MMHG | HEART RATE: 106 BPM | TEMPERATURE: 97.9 F | BODY MASS INDEX: 39.37 KG/M2 | OXYGEN SATURATION: 96 %

## 2020-12-27 PROCEDURE — 99283 EMERGENCY DEPT VISIT LOW MDM: CPT

## 2020-12-27 PROCEDURE — 6370000000 HC RX 637 (ALT 250 FOR IP): Performed by: EMERGENCY MEDICINE

## 2020-12-27 RX ORDER — HYDROCODONE BITARTRATE AND ACETAMINOPHEN 5; 325 MG/1; MG/1
1 TABLET ORAL EVERY 6 HOURS PRN
Qty: 10 TABLET | Refills: 0 | Status: SHIPPED | OUTPATIENT
Start: 2020-12-27 | End: 2020-12-30

## 2020-12-27 RX ORDER — CHLORHEXIDINE GLUCONATE 0.12 MG/ML
15 RINSE ORAL 2 TIMES DAILY
Qty: 420 ML | Refills: 0 | Status: SHIPPED | OUTPATIENT
Start: 2020-12-27 | End: 2021-01-10

## 2020-12-27 RX ORDER — AMOXICILLIN AND CLAVULANATE POTASSIUM 875; 125 MG/1; MG/1
1 TABLET, FILM COATED ORAL 2 TIMES DAILY
Qty: 20 TABLET | Refills: 0 | Status: SHIPPED | OUTPATIENT
Start: 2020-12-27 | End: 2021-01-06

## 2020-12-27 RX ORDER — AMOXICILLIN AND CLAVULANATE POTASSIUM 875; 125 MG/1; MG/1
1 TABLET, FILM COATED ORAL ONCE
Status: COMPLETED | OUTPATIENT
Start: 2020-12-27 | End: 2020-12-27

## 2020-12-27 RX ADMIN — AMOXICILLIN AND CLAVULANATE POTASSIUM 1 TABLET: 875; 125 TABLET, FILM COATED ORAL at 17:43

## 2020-12-27 ASSESSMENT — ENCOUNTER SYMPTOMS
PHOTOPHOBIA: 0
CONSTIPATION: 0
SORE THROAT: 0
EYE PAIN: 0
APNEA: 0
COUGH: 0
RHINORRHEA: 0
VOMITING: 0
ABDOMINAL PAIN: 0
NAUSEA: 0
SINUS PRESSURE: 0
WHEEZING: 0
FACIAL SWELLING: 1
ABDOMINAL DISTENTION: 0
COLOR CHANGE: 0
DIARRHEA: 0
BACK PAIN: 0
SHORTNESS OF BREATH: 0

## 2020-12-27 ASSESSMENT — PAIN SCALES - GENERAL: PAINLEVEL_OUTOF10: 7

## 2020-12-27 ASSESSMENT — PAIN DESCRIPTION - LOCATION: LOCATION: MOUTH

## 2020-12-27 ASSESSMENT — PAIN DESCRIPTION - PAIN TYPE: TYPE: ACUTE PAIN

## 2020-12-27 NOTE — ED PROVIDER NOTES
[address] [place] [address] ED  eMERGENCY dEPARTMENT eNCOUnter      Pt Name: Dl Hendricks  MRN: [id number]  [place] 1985  Date of evaluation: 12/27/2020  Provider: Rodrick Acuna MD    CHIEF COMPLAINT       Chief Complaint   Patient presents with    Dental Pain     upper and lower right sided jaw pain, pt has no teeth         HISTORY OF PRESENT ILLNESS   (Location/Symptom, Timing/Onset,Context/Setting, Quality, Duration, Modifying Factors, Severity)  Note limiting factors. Dl Hendricks is a 38 y.o. male who presents to the emergency department with complaint of dental pain status post whole mouth extraction. An appointment with his dentist and oral surgeon on Friday. Pain is 7 in a scale of 1-10 and sharp. Denies any other systemic symptoms. Increasing stress and anxiety. HPI    Nursing Notes were reviewed. REVIEW OF SYSTEMS    (2-9 systems for level 4, 10 or more for level 5)     Review of Systems   Constitutional: Negative. Negative for activity change, appetite change, chills, fatigue and fever. HENT: Positive for dental problem and facial swelling. Negative for congestion, ear discharge, ear pain, hearing loss, rhinorrhea, sinus pressure and sore throat. Eyes: Negative for photophobia, pain and visual disturbance. Respiratory: Negative for apnea, cough, shortness of breath and wheezing. Cardiovascular: Negative for chest pain, palpitations and leg swelling. Gastrointestinal: Negative for abdominal distention, abdominal pain, constipation, diarrhea, nausea and vomiting. Endocrine: Negative for cold intolerance, heat intolerance and polyuria. Genitourinary: Negative for dysuria, flank pain, frequency and urgency. Musculoskeletal: Negative for arthralgias, back pain, gait problem, myalgias and neck stiffness. Skin: Negative for color change, pallor and rash. Allergic/Immunologic: Negative for food allergies and immunocompromised state.    Neurological: Negative for dizziness, tremors, syncope, weakness, light-headedness and headaches. Psychiatric/Behavioral: Negative for agitation, confusion and hallucinations. All other systems reviewed and are negative. Except as noted above the remainder of the review of systems was reviewed and negative.        PAST MEDICAL HISTORY     Past Medical History:   Diagnosis Date    Chronic back pain     Diabetes mellitus ([address]     Diabetes mellitus ([address]     Hypertension     Hypertension          SURGICAL HISTORY       Past Surgical History:   Procedure Laterality Date    HERNIA REPAIR      MOUTH SURGERY      TONSILLECTOMY AND ADENOIDECTOMY           CURRENT MEDICATIONS       Discharge Medication List as of 12/27/2020  5:44 PM      CONTINUE these medications which have NOT CHANGED    Details   meloxicam (MOBIC) 7.5 MG tablet Take 1 tablet by mouth daily for 14 days, Disp-14 tablet,R-0Print      ketorolac (TORADOL) 10 MG tablet Take 1 tablet by mouth every 8 hours as needed for Pain, Disp-6 tablet,R-0Print      insulin glargine (LANTUS) 100 UNIT/ML injection vial Inject into the skin nightlyHistorical Med      Insulin Aspart (NOVOLOG SC) Inject into the skinHistorical Med      melatonin (RA MELATONIN) 3 MG TABS tablet Take 1 tablet by mouth nightly as needed (insomnia), Disp-15 tablet, R-3Print      glipiZIDE (GLUCOTROL) 5 MG tablet Take 1 tablet by mouth 2 times daily (before meals), Disp-60 tablet, R-0Normal      metFORMIN (GLUCOPHAGE-XR) 500 MG extended release tablet Take 1 tablet by mouth 2 times daily, Disp-60 tablet, R-0Normal      metoprolol tartrate (LOPRESSOR) 50 MG tablet Take 1 tablet by mouth 2 times daily, Disp-60 tablet, R-0Normal      FREESTYLE LITE strip 1 each by Does not apply route 3 times daily, Disp-500 each, R-5, DAWNormal      Blood Glucose Monitoring Suppl (FREESTYLE LITE) NICHOLAS R-0, Historical Med      Blood Pressure Monitoring (BLOOD PRESSURE KIT) NICHOLAS Dispense 1 machine with large cuff, diagnosis hypertension and diabetes.     Patient is to take blood pressure 2x a day and record., Disp-1 Device, R-0Print             ALLERGIES     Buspar [buspirone], Risperidone, Clindamycin/lincomycin, Tramadol, and Ketorolac    FAMILY HISTORY       Family History   Problem Relation Age of Onset    Heart Disease Mother     High Blood Pressure Mother     Diabetes Mother     High Blood Pressure Father           SOCIAL HISTORY       Social History     Socioeconomic History    Marital status: Single     Spouse name: None    Number of children: None    Years of education: None    Highest education level: None   Occupational History    None   Social Needs    Financial resource strain: None    Food insecurity     Worry: None     Inability: None    Transportation needs     Medical: None     Non-medical: None   Tobacco Use    Smoking status: Current Every Day Smoker     Packs/day: 1.00     Types: Cigarettes    Smokeless tobacco: Never Used    Tobacco comment: trying to quit   Substance and Sexual Activity    Alcohol use: No    Drug use: No    Sexual activity: Yes     Partners: Female   Lifestyle    Physical activity     Days per week: None     Minutes per session: None    Stress: None   Relationships    Social connections     Talks on phone: None     Gets together: None     Attends [employer] service: None     Active member of club or organization: None     Attends meetings of clubs or organizations: None     Relationship status: None    Intimate partner violence     Fear of current or ex partner: None     Emotionally abused: None     Physically abused: None     Forced sexual activity: None   Other Topics Concern    None   Social History Narrative    None       SCREENINGS             PHYSICAL EXAM    (up to 7 for level 4, 8 or more for level 5)     ED Triage Vitals [12/27/20 1635]   BP Temp Temp Source Pulse Resp SpO2 Height Weight   (!) 143/85 97.9 °F (36.6 °C) Temporal 106 18 96 % 5' 10\" (1.778 m) 275 lb (124.7 kg) Physical Exam  Vitals signs and nursing note reviewed. Constitutional:       General: He is not in acute distress. Appearance: Normal appearance. He is well-developed. He is obese. He is not ill-appearing, toxic-appearing or diaphoretic. HENT:      Head: Normocephalic and atraumatic. Nose: Nose normal. No congestion or rhinorrhea. Mouth/Throat:      Mouth: Mucous membranes are moist.      Pharynx: Oropharynx is clear. No oropharyngeal exudate or posterior oropharyngeal erythema. Eyes:      General: No scleral icterus. Right eye: No discharge. Left eye: No discharge. Conjunctiva/sclera: Conjunctivae normal.      Pupils: Pupils are equal, round, and reactive to light. Neck:      Musculoskeletal: Normal range of motion and neck supple. No neck rigidity or muscular tenderness. Thyroid: No thyromegaly. Vascular: No carotid bruit or JVD. Trachea: No tracheal deviation. Cardiovascular:      Rate and Rhythm: Normal rate and regular rhythm. Heart sounds: Normal heart sounds. No murmur. No friction rub. No gallop. Pulmonary:      Effort: Pulmonary effort is normal. No respiratory distress. Breath sounds: Normal breath sounds. No stridor. No wheezing, rhonchi or rales. Chest:      Chest wall: No tenderness. Abdominal:      General: Bowel sounds are normal. There is no distension. Palpations: Abdomen is soft. There is no mass. Tenderness: There is no abdominal tenderness. There is no right CVA tenderness, left CVA tenderness, guarding or rebound. Hernia: No hernia is present. Musculoskeletal: Normal range of motion. General: No swelling, tenderness, deformity or signs of injury. Right lower leg: No edema. Left lower leg: No edema. Lymphadenopathy:      Cervical: No cervical adenopathy. Skin:     General: Skin is warm and dry. Coloration: Skin is not jaundiced or pale.       Findings: No bruising, erythema, lesion or rash. Neurological:      Mental Status: He is alert and oriented to person, place, and time. Cranial Nerves: No cranial nerve deficit. Sensory: No sensory deficit. Motor: No weakness or abnormal muscle tone. Coordination: Coordination normal.      Gait: Gait normal.      Deep Tendon Reflexes: Reflexes are normal and symmetric. Reflexes normal.   Psychiatric:         Behavior: Behavior normal.         Thought Content: Thought content normal.         Judgment: Judgment normal.         DIAGNOSTIC RESULTS     EKG: All EKG's are interpreted by the Emergency Department Physician who either signs or Co-signs this chart in the absence of a cardiologist.        RADIOLOGY:   Non-plain film images such as CT, Ultrasound and MRI are read by the radiologist. Helane Raymond radiographicimages are visualized and preliminarily interpreted by the emergency physician with the below findings:        Interpretation per the Radiologist below, if available at the time of this note:    No orders to display         ED BEDSIDE ULTRASOUND:   Performed by ED Physician - none    LABS:  Labs Reviewed - No data to display    All other labs were within normal range or not returned as of this dictation. EMERGENCY DEPARTMENT COURSE and DIFFERENTIALDIAGNOSIS/MDM:   Vitals:    Vitals:    12/27/20 1635   BP: (!) 143/85   Pulse: 106   Resp: 18   Temp: 97.9 °F (36.6 °C)   TempSrc: Temporal   SpO2: 96%   Weight: 275 lb (124.7 kg)   Height: 5' 10\" (1.778 m)           MDM  Number of Diagnoses or Management Options  Risk of Complications, Morbidity, and/or Mortality  Presenting problems: moderate  Diagnostic procedures: low  Management options: moderate    Patient Progress  Patient progress: stable      CRITICAL CARE TIME   Total Critical Care time was  minutes, excluding separately reportable procedures.   There was a high probability of clinically significant/life threatening deterioration in the patient's condition which required my urgentintervention. CONSULTS:  None    PROCEDURES:  Unless otherwise noted below, none     Procedures    FINAL IMPRESSION      1. Pain, dental    2. Dental abscess    3. Anxiety state          DISPOSITION/PLAN   DISPOSITION Decision To Discharge 12/27/2020 05:34:28 PM      PATIENT REFERRED TO:  [facility]  [address] [place]  [phone]  In 3 days        DISCHARGE MEDICATIONS:  Discharge Medication List as of 12/27/2020  5:44 PM      START taking these medications    Details   amoxicillin-clavulanate (AUGMENTIN) 875-125 MG per tablet Take 1 tablet by mouth 2 times daily for 10 days, Disp-20 tablet, R-0Normal      chlorhexidine (PERIDEX) 0.12 % solution Take 15 mLs by mouth 2 times daily for 14 days, Disp-420 mL, R-0Normal      HYDROcodone-acetaminophen (NORCO) 5-325 MG per tablet Take 1 tablet by mouth every 6 hours as needed for Pain for up to 3 days. Intended supply: 3 days.  Take lowest dose possible to manage pain, Disp-10 tablet, R-0Print                (Please note that portions of this note were completed with a voice recognitionprogram.  Efforts were made to edit the dictations but occasionally words are mis-transcribed.)    Allyson Pelayo MD (electronically signed)  Attending Emergency Physician          Allyson Pelayo MD  12/27/20 Miya Santos MD  12/27/20 [id number]

## 2021-01-13 ENCOUNTER — HOSPITAL ENCOUNTER (EMERGENCY)
Age: 36
Discharge: HOME | End: 2021-01-13
Attending: EMERGENCY MEDICINE
Payer: MEDICARE

## 2021-01-13 VITALS
BODY MASS INDEX: 45.1 KG/M2 | TEMPERATURE: 98.1 F | RESPIRATION RATE: 18 BRPM | WEIGHT: 315 LBS | HEIGHT: 70 IN | HEART RATE: 107 BPM | DIASTOLIC BLOOD PRESSURE: 91 MMHG | OXYGEN SATURATION: 96 % | SYSTOLIC BLOOD PRESSURE: 148 MMHG

## 2021-01-13 DIAGNOSIS — G89.29 ACUTE EXACERBATION OF CHRONIC LOW BACK PAIN: ICD-10-CM

## 2021-01-13 DIAGNOSIS — M71.21 BAKER'S CYST OF KNEE, RIGHT: Primary | ICD-10-CM

## 2021-01-13 DIAGNOSIS — M54.50 ACUTE EXACERBATION OF CHRONIC LOW BACK PAIN: ICD-10-CM

## 2021-01-13 PROCEDURE — 96372 THER/PROPH/DIAG INJ SC/IM: CPT

## 2021-01-13 PROCEDURE — 6370000000 HC RX 637 (ALT 250 FOR IP): Performed by: EMERGENCY MEDICINE

## 2021-01-13 PROCEDURE — 6360000002 HC RX W HCPCS: Performed by: EMERGENCY MEDICINE

## 2021-01-13 PROCEDURE — 99283 EMERGENCY DEPT VISIT LOW MDM: CPT

## 2021-01-13 RX ORDER — OXYCODONE HYDROCHLORIDE AND ACETAMINOPHEN 5; 325 MG/1; MG/1
2 TABLET ORAL ONCE
Status: COMPLETED | OUTPATIENT
Start: 2021-01-13 | End: 2021-01-13

## 2021-01-13 RX ORDER — ORPHENADRINE CITRATE 100 MG/1
100 TABLET, EXTENDED RELEASE ORAL 2 TIMES DAILY
Qty: 20 TABLET | Refills: 0 | Status: SHIPPED | OUTPATIENT
Start: 2021-01-13 | End: 2021-01-23

## 2021-01-13 RX ORDER — ORPHENADRINE CITRATE 30 MG/ML
60 INJECTION INTRAMUSCULAR; INTRAVENOUS ONCE
Status: COMPLETED | OUTPATIENT
Start: 2021-01-13 | End: 2021-01-13

## 2021-01-13 RX ORDER — OXYCODONE HYDROCHLORIDE AND ACETAMINOPHEN 5; 325 MG/1; MG/1
1 TABLET ORAL EVERY 6 HOURS PRN
Qty: 10 TABLET | Refills: 0 | Status: SHIPPED | OUTPATIENT
Start: 2021-01-13 | End: 2021-01-16

## 2021-01-13 RX ADMIN — ORPHENADRINE CITRATE 60 MG: 60 INJECTION INTRAMUSCULAR; INTRAVENOUS at 22:03

## 2021-01-13 RX ADMIN — OXYCODONE HYDROCHLORIDE AND ACETAMINOPHEN 2 TABLET: 5; 325 TABLET ORAL at 22:03

## 2021-01-13 ASSESSMENT — ENCOUNTER SYMPTOMS
BACK PAIN: 1
SINUS PRESSURE: 0
CONSTIPATION: 0
ABDOMINAL PAIN: 0
NAUSEA: 0
RHINORRHEA: 0
PHOTOPHOBIA: 0
EYE PAIN: 0
VOMITING: 0
DIARRHEA: 0
COUGH: 0
WHEEZING: 0
COLOR CHANGE: 0
ABDOMINAL DISTENTION: 0
APNEA: 0
SORE THROAT: 0
SHORTNESS OF BREATH: 0

## 2021-01-13 ASSESSMENT — PAIN DESCRIPTION - FREQUENCY
FREQUENCY: CONTINUOUS
FREQUENCY: CONTINUOUS

## 2021-01-13 ASSESSMENT — PAIN DESCRIPTION - PAIN TYPE: TYPE: CHRONIC PAIN

## 2021-01-13 ASSESSMENT — PAIN DESCRIPTION - DESCRIPTORS: DESCRIPTORS: SHARP;SHOOTING

## 2021-01-13 ASSESSMENT — PAIN DESCRIPTION - PROGRESSION: CLINICAL_PROGRESSION: GRADUALLY IMPROVING

## 2021-01-13 ASSESSMENT — PAIN - FUNCTIONAL ASSESSMENT: PAIN_FUNCTIONAL_ASSESSMENT: 0-10

## 2021-01-13 ASSESSMENT — PAIN DESCRIPTION - LOCATION: LOCATION: BACK

## 2021-01-13 ASSESSMENT — PAIN DESCRIPTION - ORIENTATION: ORIENTATION: LOWER

## 2021-01-14 NOTE — ED PROVIDER NOTES
[address] [place] [address] ED  eMERGENCY dEPARTMENT eNCOUnter      Pt Name: Agnes Gallego  MRN: [id number]  [place] 1985  Date of evaluation: 1/13/2021  Provider: Cristopher Bobo MD    CHIEF COMPLAINT       Chief Complaint   Patient presents with    Cyst     Cyst on RT knee. Lower back pain         HISTORY OF PRESENT ILLNESS   (Location/Symptom, Timing/Onset,Context/Setting, Quality, Duration, Modifying Factors, Severity)  Note limiting factors. Agnes Gallego is a 43 y.o. male who presents to the emergency department with complaint of right knee Baker's cyst pain and flare of low back pain. Denies new injuries. Patient is in pain management. Pain is 8 in a scale of 1-10 and sharp. No extremity weakness or numbness. No bladder or bowel dysfunction. HPI    Nursing Notes were reviewed. REVIEW OF SYSTEMS    (2-9 systems for level 4, 10 or more for level 5)     Review of Systems   Constitutional: Negative. Negative for activity change, appetite change, chills, fatigue and fever. HENT: Negative for congestion, ear discharge, ear pain, hearing loss, rhinorrhea, sinus pressure and sore throat. Eyes: Negative for photophobia, pain and visual disturbance. Respiratory: Negative for apnea, cough, shortness of breath and wheezing. Cardiovascular: Negative for chest pain, palpitations and leg swelling. Gastrointestinal: Negative for abdominal distention, abdominal pain, constipation, diarrhea, nausea and vomiting. Endocrine: Negative for cold intolerance, heat intolerance and polyuria. Genitourinary: Negative for dysuria, flank pain, frequency and urgency. Musculoskeletal: Positive for arthralgias, back pain and joint swelling. Negative for gait problem, myalgias and neck stiffness. Skin: Negative for color change, pallor and rash. Allergic/Immunologic: Negative for food allergies and immunocompromised state.    Neurological: Negative for dizziness, tremors, syncope, weakness, light-headedness and headaches. Psychiatric/Behavioral: Negative for agitation, confusion and hallucinations. All other systems reviewed and are negative. Except as noted above the remainder of the review of systems was reviewed and negative. PAST MEDICAL HISTORY     Past Medical History:   Diagnosis Date    Chronic back pain     [facility] mellitus ([facility] [address]     Diabetes mellitus ([facility] [address]     Hypertension     Hypertension          SURGICAL HISTORY       Past Surgical History:   Procedure Laterality Date    HERNIA REPAIR      MOUTH SURGERY      TONSILLECTOMY AND ADENOIDECTOMY           CURRENT MEDICATIONS       Discharge Medication List as of 1/13/2021 10:15 PM      CONTINUE these medications which have NOT CHANGED    Details   glipiZIDE (GLUCOTROL) 5 MG tablet Take 1 tablet by mouth 2 times daily (before meals), Disp-60 tablet, R-0Normal      metFORMIN (GLUCOPHAGE-XR) 500 MG extended release tablet Take 1 tablet by mouth 2 times daily, Disp-60 tablet, R-0Normal      metoprolol tartrate (LOPRESSOR) 50 MG tablet Take 1 tablet by mouth 2 times daily, Disp-60 tablet, R-0Normal      meloxicam (MOBIC) 7.5 MG tablet Take 1 tablet by mouth daily for 14 days, Disp-14 tablet,R-0Print      ketorolac (TORADOL) 10 MG tablet Take 1 tablet by mouth every 8 hours as needed for Pain, Disp-6 tablet,R-0Print      insulin glargine (LANTUS) 100 UNIT/ML injection vial Inject into the skin nightlyHistorical Med      Insulin Aspart (NOVOLOG SC) Inject into the skinHistorical Med      melatonin (RA MELATONIN) 3 MG TABS tablet Take 1 tablet by mouth nightly as needed (insomnia), Disp-15 tablet, R-3Print      FREESTYLE LITE strip 1 each by Does not apply route 3 times daily, Disp-500 each, R-5, DAWNormal      Blood Glucose Monitoring Suppl (FREESTYLE LITE) NICHOLAS R-0, Historical Med      Blood Pressure Monitoring (BLOOD PRESSURE KIT) NICHOLAS Dispense 1 machine with large cuff, diagnosis hypertension and diabetes.     Patient is to take blood pressure 2x jaundiced or pale. Findings: No bruising, erythema, lesion or rash. Neurological:      Mental Status: He is alert and oriented to person, place, and time. Mental status is at baseline. Cranial Nerves: No cranial nerve deficit. Sensory: No sensory deficit. Motor: No weakness or abnormal muscle tone. Coordination: Coordination normal.      Gait: Gait normal.      Deep Tendon Reflexes: Reflexes are normal and symmetric. Reflexes normal.   Psychiatric:         Behavior: Behavior normal.         Thought Content: Thought content normal.         Judgment: Judgment normal.         DIAGNOSTIC RESULTS     EKG: All EKG's are interpreted by the Emergency Department Physician who either signs or Co-signs this chart in the absence of a cardiologist.        RADIOLOGY:   Non-plain film images such as CT, Ultrasound and MRI are read by the radiologist. [employer] radiographicimages are visualized and preliminarily interpreted by the emergency physician with the below findings:        Interpretation per the Radiologist below, if available at the time of this note:    No orders to display         ED BEDSIDE ULTRASOUND:   Performed by ED Physician - none    LABS:  Labs Reviewed - No data to display    All other labs were within normal range or not returned as of this dictation. EMERGENCY DEPARTMENT COURSE and DIFFERENTIALDIAGNOSIS/MDM:   Vitals:    Vitals:    01/13/21 2144   BP: (!) 148/91   Pulse: 107   Resp: 18   Temp: 98.1 °F (36.7 °C)   TempSrc: Oral   SpO2: 96%   Weight: (!) 375 lb (170.1 kg)   Height: 5' 10\" (1.778 m)           MDM  Number of Diagnoses or Management Options  Risk of Complications, Morbidity, and/or Mortality  Presenting problems: moderate  Diagnostic procedures: low  Management options: moderate    Patient Progress  Patient progress: improved      CRITICAL CARE TIME   Total Critical Care time was  minutes, excluding separately reportable procedures.   There was a high probability of

## 2021-01-20 ENCOUNTER — APPOINTMENT (OUTPATIENT)
Dept: GENERAL RADIOLOGY | Age: 36
End: 2021-01-20
Payer: MEDICARE

## 2021-01-20 ENCOUNTER — HOSPITAL ENCOUNTER (EMERGENCY)
Age: 36
Discharge: HOME | End: 2021-01-20
Attending: EMERGENCY MEDICINE
Payer: MEDICARE

## 2021-01-20 VITALS
SYSTOLIC BLOOD PRESSURE: 140 MMHG | WEIGHT: 278 LBS | BODY MASS INDEX: 39.8 KG/M2 | HEART RATE: 97 BPM | TEMPERATURE: 97.7 F | RESPIRATION RATE: 18 BRPM | HEIGHT: 70 IN | OXYGEN SATURATION: 97 % | DIASTOLIC BLOOD PRESSURE: 91 MMHG

## 2021-01-20 DIAGNOSIS — M67.462 GANGLION OF LEFT KNEE: Primary | ICD-10-CM

## 2021-01-20 DIAGNOSIS — M25.562 ACUTE PAIN OF LEFT KNEE: ICD-10-CM

## 2021-01-20 DIAGNOSIS — M54.50 ACUTE EXACERBATION OF CHRONIC LOW BACK PAIN: ICD-10-CM

## 2021-01-20 DIAGNOSIS — G89.29 ACUTE EXACERBATION OF CHRONIC LOW BACK PAIN: ICD-10-CM

## 2021-01-20 DIAGNOSIS — R73.9 HYPERGLYCEMIA: ICD-10-CM

## 2021-01-20 LAB
CHP ED QC CHECK: YES
GLUCOSE BLD-MCNC: 304 MG/DL
GLUCOSE BLD-MCNC: 304 MG/DL (ref 60–115)
PERFORMED ON: ABNORMAL

## 2021-01-20 PROCEDURE — 99283 EMERGENCY DEPT VISIT LOW MDM: CPT

## 2021-01-20 PROCEDURE — 6370000000 HC RX 637 (ALT 250 FOR IP): Performed by: EMERGENCY MEDICINE

## 2021-01-20 PROCEDURE — 73564 X-RAY EXAM KNEE 4 OR MORE: CPT

## 2021-01-20 RX ORDER — HYDROCODONE BITARTRATE AND ACETAMINOPHEN 5; 325 MG/1; MG/1
1 TABLET ORAL EVERY 6 HOURS PRN
Qty: 12 TABLET | Refills: 0 | Status: SHIPPED | OUTPATIENT
Start: 2021-01-20 | End: 2021-01-24

## 2021-01-20 RX ORDER — CYCLOBENZAPRINE HCL 10 MG
10 TABLET ORAL 3 TIMES DAILY PRN
Qty: 21 TABLET | Refills: 0 | Status: SHIPPED | OUTPATIENT
Start: 2021-01-20 | End: 2021-01-23 | Stop reason: SDUPTHER

## 2021-01-20 RX ORDER — HYDROCODONE BITARTRATE AND ACETAMINOPHEN 5; 325 MG/1; MG/1
1 TABLET ORAL ONCE
Status: COMPLETED | OUTPATIENT
Start: 2021-01-20 | End: 2021-01-20

## 2021-01-20 RX ORDER — DEXAMETHASONE 4 MG/1
8 TABLET ORAL ONCE
Status: DISCONTINUED | OUTPATIENT
Start: 2021-01-20 | End: 2021-01-20

## 2021-01-20 RX ADMIN — HYDROCODONE BITARTRATE AND ACETAMINOPHEN 1 TABLET: 5; 325 TABLET ORAL at 11:44

## 2021-01-20 ASSESSMENT — ENCOUNTER SYMPTOMS
BACK PAIN: 1
DIARRHEA: 0
CHOKING: 0
EYE REDNESS: 0
CHEST TIGHTNESS: 0
WHEEZING: 0
VOICE CHANGE: 0
ABDOMINAL PAIN: 0
SINUS PRESSURE: 0
FACIAL SWELLING: 0
SORE THROAT: 0
BLOOD IN STOOL: 0
COUGH: 0
EYE PAIN: 0
EYE DISCHARGE: 0
VOMITING: 0
SHORTNESS OF BREATH: 0
TROUBLE SWALLOWING: 0
CONSTIPATION: 0
STRIDOR: 0

## 2021-01-20 ASSESSMENT — PAIN DESCRIPTION - PAIN TYPE: TYPE: ACUTE PAIN

## 2021-01-20 ASSESSMENT — PAIN DESCRIPTION - ORIENTATION: ORIENTATION: LOWER;MID

## 2021-01-20 ASSESSMENT — PAIN DESCRIPTION - FREQUENCY: FREQUENCY: CONTINUOUS

## 2021-01-20 ASSESSMENT — PAIN DESCRIPTION - LOCATION: LOCATION: BACK

## 2021-01-20 ASSESSMENT — PAIN SCALES - GENERAL
PAINLEVEL_OUTOF10: 8
PAINLEVEL_OUTOF10: 8

## 2021-01-20 ASSESSMENT — PAIN DESCRIPTION - PROGRESSION: CLINICAL_PROGRESSION: GRADUALLY WORSENING

## 2021-01-20 ASSESSMENT — PAIN DESCRIPTION - DESCRIPTORS: DESCRIPTORS: SHARP

## 2021-01-20 ASSESSMENT — PAIN DESCRIPTION - ONSET: ONSET: ON-GOING

## 2021-01-20 NOTE — ED PROVIDER NOTES
[address] [facility] ED  eMERGENCY dEPARTMENT eNCOUnter      Pt Name: Melvin Cruz  MRN: [id number]  [place] 1985  Date of evaluation: 1/20/2021  Provider: Prakash Calero MD    [address] [place] [address]       Chief Complaint   Patient presents with    Back Pain     Lower back pain. Onset- 1 yr, unable to follow up with PCP or specialist.    Knee Pain     Bakers cyst on right knee. Onset- 1 yr, recently became more painful. HISTORY OF PRESENT ILLNESS   (Location/Symptom, Timing/Onset,Context/Setting, Quality, Duration, Modifying Factors, Severity)  Note limiting factors. Melvin Cruz is a 43 y.o. male who presents to the emergency department patient history hypertension ADHD obesity type 2 diabetes and has a chronic pain management including chronic back pain last back x-ray done 2018 showed patient has a midline narrowing of the L1-L2 otherwise normal x-ray no spondylolysis or spondylolisthesis no fever no UTI symptoms no numbness tingling to the legs no fever no chills no injury or fall back pain brought him here. His pain is 6-7 out of 10, patient also complaining of right knee pain after patient he was diagnosed with Baker's cyst and his pain is mostly in the kneecap area in the front not in the back    HPI    NursingNotes were reviewed. REVIEW OF SYSTEMS    (2-9 systems for level 4, 10 or more for level 5)     Review of Systems   Constitutional: Negative. Negative for activity change and fever. HENT: Negative for congestion, drooling, facial swelling, mouth sores, nosebleeds, sinus pressure, sore throat, trouble swallowing and voice change. Eyes: Negative for pain, discharge, redness and visual disturbance. Respiratory: Negative for cough, choking, chest tightness, shortness of breath, wheezing and stridor. Cardiovascular: Negative for chest pain, palpitations and leg swelling. Gastrointestinal: Negative for abdominal pain, blood in stool, constipation, diarrhea and vomiting.    Endocrine: Negative for cold intolerance, polyphagia and polyuria. Genitourinary: Negative for dysuria, flank pain, frequency, genital sores and urgency. Musculoskeletal: Positive for arthralgias and back pain. Negative for joint swelling, neck pain and neck stiffness. Skin: Negative for pallor and rash. Neurological: Negative for tremors, seizures, syncope, weakness, numbness and headaches. Hematological: Negative for adenopathy. Does not bruise/bleed easily. Psychiatric/Behavioral: Negative for agitation, behavioral problems, hallucinations and sleep disturbance. The patient is nervous/anxious. The patient is not hyperactive. All other systems reviewed and are negative. Except as noted above the remainder of the review of systems was reviewed and negative. PAST MEDICAL HISTORY     Past Medical History:   Diagnosis Date    Chronic back pain     Diabetes mellitus ([facility] [address]     Diabetes mellitus ([facility] [address] [facility]     Hypertension     Hypertension          SURGICALHISTORY       Past Surgical History:   Procedure Laterality Date    HERNIA REPAIR      MOUTH SURGERY      TONSILLECTOMY AND ADENOIDECTOMY           CURRENT MEDICATIONS       Previous Medications    BLOOD GLUCOSE MONITORING SUPPL (FREESTYLE LITE) NICHOLAS    U UTD TO TEST 5-6 TIMES D    BLOOD PRESSURE MONITORING (BLOOD PRESSURE KIT) NICHOLAS    Dispense 1 machine with large cuff, diagnosis hypertension and diabetes. Patient is to take blood pressure 2x a day and record.     FREESTYLE LITE STRIP    1 each by Does not apply route 3 times daily    GLIPIZIDE (GLUCOTROL) 5 MG TABLET    Take 1 tablet by mouth 2 times daily (before meals)    INSULIN ASPART (NOVOLOG SC)    Inject into the skin    INSULIN GLARGINE (LANTUS) 100 UNIT/ML INJECTION VIAL    Inject into the skin nightly    KETOROLAC (TORADOL) 10 MG TABLET    Take 1 tablet by mouth every 8 hours as needed for Pain    MELATONIN (RA MELATONIN) 3 MG TABS TABLET    Take 1 tablet by mouth nightly as needed Narrative    None       SCREENINGS      @FLOW([id number])@      PHYSICAL EXAM    (up to 7 for level 4, 8 or more for level 5)     ED Triage Vitals   BP Temp Temp src Pulse Resp SpO2 Height Weight   -- -- -- -- -- -- -- --       Physical Exam  Vitals signs and nursing note reviewed. Constitutional:       General: He is not in acute distress. Appearance: Normal appearance. He is well-developed. He is not ill-appearing, toxic-appearing or diaphoretic. HENT:      Head: Normocephalic and atraumatic. Right Ear: Ear canal and external ear normal.      Left Ear: Tympanic membrane and external ear normal.      Nose: Nose normal. No rhinorrhea. Mouth/Throat:      Mouth: Mucous membranes are moist.      Pharynx: No oropharyngeal exudate or posterior oropharyngeal erythema. Eyes:      General: No scleral icterus. Right eye: No discharge. Extraocular Movements: Extraocular movements intact. Pupils: Pupils are equal, round, and reactive to light. Neck:      Musculoskeletal: Neck supple. No neck rigidity or muscular tenderness. Vascular: No carotid bruit. Cardiovascular:      Rate and Rhythm: Normal rate and regular rhythm. Pulses: Normal pulses. Heart sounds: Normal heart sounds. No murmur. No friction rub. No gallop. Pulmonary:      Effort: Pulmonary effort is normal. No respiratory distress. Breath sounds: Normal breath sounds. No stridor. No wheezing. Chest:      Chest wall: No tenderness. Abdominal:      General: Bowel sounds are normal. There is no distension. Palpations: Abdomen is soft. There is no mass. Tenderness: There is no abdominal tenderness. There is no right CVA tenderness, guarding or rebound. Musculoskeletal: Normal range of motion. General: Tenderness present. No swelling or deformity. Right lower leg: No edema.       Comments: Attention turned to the back examined in supine and sitting position patient has paralumbar spasm and tenderness slightly more to the left no CVA tenderness elicited no point tenderness patient has no hematoma no bruise noted straight leg raising is negative no sensory deficit to the lower extremities,   attention come to the knees patient has no joint effusion no cartilage injury patient has a no suspicion of Baker's cyst as most of pain is located in the kneecap and anteriorly to the right knee no warmth to touch no suspicion of gout either, AP  Swelling over the anterior aspect of the left knee over the patella noted without any cellulitis   Lymphadenopathy:      Cervical: No cervical adenopathy. Skin:     General: Skin is warm. Capillary Refill: Capillary refill takes less than 2 seconds. Coloration: Skin is not jaundiced. Findings: No bruising, erythema, lesion or rash. Neurological:      General: No focal deficit present. Mental Status: He is alert and oriented to person, place, and time. Cranial Nerves: No cranial nerve deficit. Sensory: No sensory deficit. Motor: No abnormal muscle tone. Coordination: Coordination normal.      Gait: Gait normal.      Deep Tendon Reflexes: Reflexes normal.   Psychiatric:         Behavior: Behavior normal.         Thought Content:  Thought content normal.         DIAGNOSTIC RESULTS     EKG: All EKG's are interpreted by the Emergency Department Physician who either signs or Co-signsthis chart in the absence of a cardiologist.      RADIOLOGY:   Jena Hilts such as CT, Ultrasound and MRI are read by the radiologist. Plain radiographic images are visualized and preliminarily interpreted by the emergency physician with the below findings:        Interpretation per the Radiologist below, if available at the time ofthis note:    XR KNEE RIGHT (MIN 4 VIEWS)    (Results Pending)         ED BEDSIDE ULTRASOUND:   Performed by ED Physician - none    LABS:  Labs Reviewed   POCT GLUCOSE - Abnormal; Notable for the following components:       Result Value    POC Glucose 304 (*)     All other components within normal limits   POCT GLUCOSE - Normal       All other labs were within normal range or not returned as of this dictation.     EMERGENCY DEPARTMENT COURSE and DIFFERENTIAL DIAGNOSIS/MDM:   Vitals:    Vitals:    01/20/21 1116   BP: (!) 140/91   Pulse: 97   Resp: 18   Temp: 97.7 °F (36.5 °C)   TempSrc: Oral   SpO2: 97%   Weight: 278 lb (126.1 kg)   Height: 5' 10\" (1.778 m)           MDM  Number of Diagnoses or Management Options  Acute exacerbation of chronic low back pain  Acute pain of left knee  Ganglion of left knee  Hyperglycemia  Diagnosis management comments: Patient diagnosed with Baker's cyst patient come to the emergency for acute pain to the left knee no injury no fall no fever no redness unable to see any specialist as per patient no numbness tingling to the toes patient think Carter's cyst is getting worse on examination findings are not consistent with Baker's cyst patient has not pain and swelling to the posterior aspect but rather has a small peanut sized ganglionic to the anterior aspect of the knee over the kneecap very consistent with ganglion cyst without any cellulitis without any rupture no bruises noted neurovascular is intact no cartilage injury noted patient admitted he has drained some fluid with the several packets of sugar in it prior to coming here history of diabetes mellitus advised to watch sugar carefully over the next 24 hours time and take appropriate action of insulin as needed patient understood very well patient will be referred to the pain management patient has been refused by 1 pain management doctor requesting another consultation will see his doctor Dr. Villa Cantu per patient he is not very high on giving pain medication patient is told about narcotic use and restricted prescription as needed further evaluation follow-up could be a long-term pain medication as per patient he is a disabled and takes care of his kids at home due to ADHD      [facility]   Total Critical Care time was minutes, excluding separately reportableprocedures. There was a high probability of clinicallysignificant/life threatening deterioration in the patient's condition which required my urgent intervention. ONSULTS:  None    PROCEDURES:  Unless otherwise noted below, none     Procedures    FINAL IMPRESSION      1. Ganglion of left knee    2. Acute pain of left knee    3. Acute exacerbation of chronic low back pain    4. Hyperglycemia          DISPOSITION/PLAN   DISPOSITION Decision To Discharge 01/20/2021 12:14:26 PM      PATIENT REFERRED TO:  Ly Camarena MD  [address] [facility] , [facility] [address] [facility] [phone]    In 1 week      Ramona Upton MD  [facility] [phone]    In 1 week  You are referred to pain management to review your pain management status and further evaluation      DISCHARGE MEDICATIONS:  New Prescriptions    CYCLOBENZAPRINE (FLEXERIL) 10 MG TABLET    Take 1 tablet by mouth 3 times daily as needed for Muscle spasms    HYDROCODONE-ACETAMINOPHEN (NORCO) 5-325 MG PER TABLET    Take 1 tablet by mouth every 6 hours as needed for Pain for up to 4 days.           (Please note that portions of this note were completed with a voice recognition program.  Efforts were made to edit the dictations but occasionally words are mis-transcribed.)    Tiera Winters MD (electronically signed)  Attending Emergency Physician       Tiera Winters MD  01/20/21 5537

## 2021-01-20 NOTE — ED TRIAGE NOTES
Patient has had back pain for several months. Patient was seen here on 1/13/2021 for same complaint. Patient has pain 8 of 10 sharp to the lower middle back.  Patient also has a right knee bakers cyst.

## 2021-01-23 ENCOUNTER — HOSPITAL ENCOUNTER (EMERGENCY)
Age: 36
Discharge: HOME | End: 2021-01-23
Attending: EMERGENCY MEDICINE
Payer: MEDICARE

## 2021-01-23 VITALS
BODY MASS INDEX: 39.8 KG/M2 | DIASTOLIC BLOOD PRESSURE: 88 MMHG | WEIGHT: 278 LBS | SYSTOLIC BLOOD PRESSURE: 138 MMHG | OXYGEN SATURATION: 96 % | TEMPERATURE: 98.1 F | HEIGHT: 70 IN | RESPIRATION RATE: 18 BRPM | HEART RATE: 104 BPM

## 2021-01-23 DIAGNOSIS — M54.50 CHRONIC MIDLINE LOW BACK PAIN, UNSPECIFIED WHETHER SCIATICA PRESENT: Primary | ICD-10-CM

## 2021-01-23 DIAGNOSIS — G89.29 OTHER CHRONIC PAIN: ICD-10-CM

## 2021-01-23 DIAGNOSIS — G89.29 CHRONIC MIDLINE LOW BACK PAIN, UNSPECIFIED WHETHER SCIATICA PRESENT: Primary | ICD-10-CM

## 2021-01-23 PROCEDURE — 6370000000 HC RX 637 (ALT 250 FOR IP): Performed by: EMERGENCY MEDICINE

## 2021-01-23 PROCEDURE — 6360000002 HC RX W HCPCS: Performed by: EMERGENCY MEDICINE

## 2021-01-23 PROCEDURE — 96372 THER/PROPH/DIAG INJ SC/IM: CPT

## 2021-01-23 PROCEDURE — 99282 EMERGENCY DEPT VISIT SF MDM: CPT

## 2021-01-23 RX ORDER — ONDANSETRON 4 MG/1
4 TABLET, ORALLY DISINTEGRATING ORAL ONCE
Status: COMPLETED | OUTPATIENT
Start: 2021-01-23 | End: 2021-01-23

## 2021-01-23 RX ORDER — CYCLOBENZAPRINE HCL 10 MG
10 TABLET ORAL 3 TIMES DAILY PRN
Qty: 21 TABLET | Refills: 0 | Status: SHIPPED | OUTPATIENT
Start: 2021-01-23 | End: 2021-02-02

## 2021-01-23 RX ORDER — PREDNISONE 20 MG/1
60 TABLET ORAL DAILY
Qty: 30 TABLET | Refills: 0 | Status: SHIPPED | OUTPATIENT
Start: 2021-01-23 | End: 2021-01-24

## 2021-01-23 RX ORDER — MORPHINE SULFATE 4 MG/ML
2 INJECTION, SOLUTION INTRAMUSCULAR; INTRAVENOUS ONCE
Status: COMPLETED | OUTPATIENT
Start: 2021-01-23 | End: 2021-01-23

## 2021-01-23 RX ADMIN — MORPHINE SULFATE 2 MG: 4 INJECTION, SOLUTION INTRAMUSCULAR; INTRAVENOUS at 18:17

## 2021-01-23 RX ADMIN — ONDANSETRON 4 MG: 4 TABLET, ORALLY DISINTEGRATING ORAL at 18:17

## 2021-01-23 ASSESSMENT — ENCOUNTER SYMPTOMS
BACK PAIN: 1
EYE DISCHARGE: 0
NAUSEA: 0
ABDOMINAL SWELLING: 0
SORE THROAT: 0
VOMITING: 0
EYE REDNESS: 0
COUGH: 0
SHORTNESS OF BREATH: 0
BOWEL INCONTINENCE: 0
ABDOMINAL PAIN: 0

## 2021-01-23 NOTE — ED PROVIDER NOTES
[facility] ED  EMERGENCY DEPARTMENT ENCOUNTER      Pt Name: Jessica Dominguez  MRN: [id number]  [place] 1985  Date of evaluation: 1/23/2021  Provider: Roseann Marquez DO        HISTORY OF PRESENT ILLNESS    Jessica Dominguez is a 43 y.o. male per chart review has ah/o diabetes, chronic back pain and HTN. He has been seen for his back pain and he states it feels like his other episodes of back pain. The history is provided by the patient. Back Pain  Location:  Lumbar spine  Quality:  Aching  Radiates to:  Does not radiate  Pain severity:  Severe  Pain is:  Same all the time  Onset quality:  Gradual  Duration:  1 day  Timing:  Constant  Progression:  Unchanged  Chronicity:  Recurrent  Relieved by:  Nothing  Worsened by:  Nothing  Ineffective treatments:  NSAIDs  Associated symptoms: no abdominal pain, no abdominal swelling, no bladder incontinence, no bowel incontinence, no chest pain, no dysuria, no fever, no headaches, no leg pain, no numbness, no paresthesias, no perianal numbness, no tingling, no weakness and no weight loss    Risk factors: lack of exercise and obesity    Risk factors: no recent surgery and no steroid use             REVIEW OF SYSTEMS       Review of Systems   Constitutional: Negative for chills, fever and weight loss. HENT: Negative for ear pain and sore throat. Eyes: Negative for discharge and redness. Respiratory: Negative for cough and shortness of breath. Cardiovascular: Negative for chest pain and palpitations. Gastrointestinal: Negative for abdominal pain, bowel incontinence, nausea and vomiting. Genitourinary: Negative for bladder incontinence, difficulty urinating and dysuria. Musculoskeletal: Positive for back pain. Negative for neck pain. Skin: Positive for rash. Negative for wound. Neurological: Negative for dizziness, tingling, syncope, weakness, numbness, headaches and paresthesias. Psychiatric/Behavioral: Negative for confusion.  The patient is not nervous/anxious. All other systems reviewed and are negative. Except as noted above the remainder of the review of systems was reviewed and negative. PAST MEDICAL HISTORY     Past Medical History:   Diagnosis Date    Chronic back pain     [facility] ([facility] [address]     Diabetes mellitus ([facility] [address]     Hypertension     Hypertension          SURGICAL HISTORY       Past Surgical History:   Procedure Laterality Date    HERNIA REPAIR      MOUTH SURGERY      TONSILLECTOMY AND ADENOIDECTOMY           CURRENT MEDICATIONS       Discharge Medication List as of 1/23/2021  6:37 PM      CONTINUE these medications which have NOT CHANGED    Details   HYDROcodone-acetaminophen (NORCO) 5-325 MG per tablet Take 1 tablet by mouth every 6 hours as needed for Pain for up to 4 days. , Disp-12 tablet, R-0Print      orphenadrine (NORFLEX) 100 MG extended release tablet Take 1 tablet by mouth 2 times daily for 10 days, Disp-20 tablet, R-0Print      meloxicam (MOBIC) 7.5 MG tablet Take 1 tablet by mouth daily for 14 days, Disp-14 tablet,R-0Print      ketorolac (TORADOL) 10 MG tablet Take 1 tablet by mouth every 8 hours as needed for Pain, Disp-6 tablet,R-0Print      insulin glargine (LANTUS) 100 UNIT/ML injection vial Inject into the skin nightlyHistorical Med      Insulin Aspart (NOVOLOG SC) Inject into the skinHistorical Med      melatonin (RA MELATONIN) 3 MG TABS tablet Take 1 tablet by mouth nightly as needed (insomnia), Disp-15 tablet, R-3Print      glipiZIDE (GLUCOTROL) 5 MG tablet Take 1 tablet by mouth 2 times daily (before meals), Disp-60 tablet, R-0Normal      metFORMIN (GLUCOPHAGE-XR) 500 MG extended release tablet Take 1 tablet by mouth 2 times daily, Disp-60 tablet, R-0Normal      metoprolol tartrate (LOPRESSOR) 50 MG tablet Take 1 tablet by mouth 2 times daily, Disp-60 tablet, R-0Normal      FREESTYLE LITE strip 1 each by Does not apply route 3 times daily, Disp-500 each, R-5, DAWNormal      Blood Glucose Monitoring Suppl (FREESTYLE LITE) NICHOLAS R-0, Historical Med      Blood Pressure Monitoring (BLOOD PRESSURE KIT) NICHOLAS Dispense 1 machine with large cuff, diagnosis hypertension and diabetes.     Patient is to take blood pressure 2x a day and record., Disp-1 Device, R-0Print             ALLERGIES     Buspar [buspirone], Risperidone, Clindamycin/lincomycin, Tramadol, and Ketorolac    FAMILY HISTORY       Family History   Problem Relation Age of Onset    Heart Disease Mother     High Blood Pressure Mother     Diabetes Mother     High Blood Pressure Father           SOCIAL HISTORY       Social History     Socioeconomic History    Marital status: Single     Spouse name: None    Number of children: None    Years of education: None    Highest education level: None   Occupational History    None   Social Needs    Financial resource strain: None    Food insecurity     Worry: None     Inability: None    Transportation needs     Medical: None     Non-medical: None   Tobacco Use    Smoking status: Current Every Day Smoker     Packs/day: 1.00     Types: Cigarettes    Smokeless tobacco: Never Used    Tobacco comment: trying to quit   Substance and Sexual Activity    Alcohol use: No    Drug use: No    Sexual activity: Yes     Partners: Female   Lifestyle    Physical activity     Days per week: None     Minutes per session: None    Stress: None   Relationships    Social connections     Talks on phone: None     Gets together: None     Attends Taoist service: None     Active member of club or organization: None     Attends meetings of clubs or organizations: None     Relationship status: None    Intimate partner violence     Fear of current or ex partner: None     Emotionally abused: None     Physically abused: None     Forced sexual activity: None   Other Topics Concern    None   Social History Narrative    None         PHYSICAL EXAM       ED Triage Vitals   BP Temp Temp Source Pulse Resp SpO2 Height Weight 01/23/21 1750 01/23/21 1745 01/23/21 1745 01/23/21 1745 01/23/21 1745 01/23/21 1750 01/23/21 1745 01/23/21 1745   138/88 98.1 °F (36.7 °C) Oral 104 18 96 % 5' 10\" (1.778 m) 278 lb (126.1 kg)       Physical Exam  Vitals signs and nursing note reviewed. Constitutional:       Appearance: Normal appearance. HENT:      Head: Normocephalic and atraumatic. Right Ear: Tympanic membrane normal.      Left Ear: Tympanic membrane normal.      Nose: Nose normal.      Mouth/Throat:      Mouth: Mucous membranes are moist.      Pharynx: Oropharynx is clear. Eyes:      General: Lids are normal.      Extraocular Movements: Extraocular movements intact. Conjunctiva/sclera: Conjunctivae normal.      Pupils: Pupils are equal, round, and reactive to light. Neck:      Musculoskeletal: Full passive range of motion without pain, normal range of motion and neck supple. Cardiovascular:      Rate and Rhythm: Normal rate and regular rhythm. Pulses: Normal pulses. Heart sounds: Normal heart sounds. Pulmonary:      Effort: Pulmonary effort is normal.      Breath sounds: Normal breath sounds. Abdominal:      General: Abdomen is flat. Bowel sounds are normal.      Palpations: Abdomen is soft. Musculoskeletal: Normal range of motion. Lumbar back: He exhibits tenderness and spasm. Skin:     General: Skin is warm. Capillary Refill: Capillary refill takes less than 2 seconds. Findings: Erythema present. Neurological:      General: No focal deficit present. Mental Status: He is alert and oriented to person, place, and time. Deep Tendon Reflexes: Reflexes are normal and symmetric. Psychiatric:         Attention and Perception: Attention and perception normal.         Mood and Affect: Mood normal.         Behavior: Behavior normal. Behavior is cooperative.            LABS:  Labs Reviewed - No data to display      MDM:   Vitals:    Vitals:    01/23/21 1745 01/23/21 1750   BP:

## 2021-01-23 NOTE — ED TRIAGE NOTES
Pt reporting abscess in groin that he had surgery on in the past is getting worse. Pt also CO back pain that he was seen her last week for that is not improved by the [facility] he received.

## 2021-01-24 ENCOUNTER — HOSPITAL ENCOUNTER (EMERGENCY)
Age: 36
Discharge: HOME | End: 2021-01-24
Attending: EMERGENCY MEDICINE
Payer: MEDICARE

## 2021-01-24 VITALS
SYSTOLIC BLOOD PRESSURE: 147 MMHG | RESPIRATION RATE: 17 BRPM | DIASTOLIC BLOOD PRESSURE: 101 MMHG | HEIGHT: 70 IN | HEART RATE: 108 BPM | WEIGHT: 278 LBS | TEMPERATURE: 97.9 F | BODY MASS INDEX: 39.8 KG/M2 | OXYGEN SATURATION: 97 %

## 2021-01-24 DIAGNOSIS — L02.419 CELLULITIS AND ABSCESS OF LEG: Primary | ICD-10-CM

## 2021-01-24 DIAGNOSIS — L03.119 CELLULITIS AND ABSCESS OF LEG: Primary | ICD-10-CM

## 2021-01-24 DIAGNOSIS — F41.1 ANXIETY STATE: ICD-10-CM

## 2021-01-24 PROCEDURE — 99283 EMERGENCY DEPT VISIT LOW MDM: CPT

## 2021-01-24 RX ORDER — SULFAMETHOXAZOLE AND TRIMETHOPRIM 800; 160 MG/1; MG/1
1 TABLET ORAL 2 TIMES DAILY
Qty: 20 TABLET | Refills: 0 | Status: SHIPPED | OUTPATIENT
Start: 2021-01-24 | End: 2021-02-03

## 2021-01-24 ASSESSMENT — ENCOUNTER SYMPTOMS
COUGH: 0
RHINORRHEA: 0
NAUSEA: 0
COLOR CHANGE: 0
ABDOMINAL DISTENTION: 0
BACK PAIN: 0
DIARRHEA: 0
SHORTNESS OF BREATH: 0
VOMITING: 0
WHEEZING: 0
PHOTOPHOBIA: 0
EYE PAIN: 0
APNEA: 0
ABDOMINAL PAIN: 0
SINUS PRESSURE: 0
CONSTIPATION: 0
SORE THROAT: 0

## 2021-01-24 ASSESSMENT — PAIN DESCRIPTION - PAIN TYPE: TYPE: ACUTE PAIN

## 2021-01-24 ASSESSMENT — PAIN SCALES - GENERAL: PAINLEVEL_OUTOF10: 6

## 2021-01-24 ASSESSMENT — PAIN DESCRIPTION - ORIENTATION: ORIENTATION: LEFT

## 2021-01-24 NOTE — ED NOTES
Pt was given d/c instructions and informed to pick his antibiotic up at [facility] in [facility]. Pt voiced understanding of d/c instructions without further questions.       Abel Gill RN  01/24/21 3035

## 2021-01-24 NOTE — ED TRIAGE NOTES
Pt arrives to ED, from home, via personal vehicle. Pt presents with an abscess in his left groin-Dr. Matthias Esteban in to examine pt.

## 2021-01-24 NOTE — ED PROVIDER NOTES
[address] [place] [address] ED  eMERGENCY dEPARTMENT eNCOUnter      Pt Name: Isa Escalera  MRN: [id number]  [place] 1985  Date of evaluation: 1/24/2021  Provider: Omid Maria MD    CHIEF COMPLAINT       Chief Complaint   Patient presents with    Abscess     left groin         HISTORY OF PRESENT ILLNESS   (Location/Symptom, Timing/Onset,Context/Setting, Quality, Duration, Modifying Factors, Severity)  Note limiting factors. Isa Escalera is a 43 y.o. male who presents to the emergency department with complaint of left thigh induration of 1 week duration. Was seen here yesterday for back pain and treated. Patient claims recurrent abscesses in his lower extremities. Claims pain of 6 on a scale of 1-10 and worse with touch. Pain does not radiate. Denies any other systemic symptoms. HPI    Nursing Notes were reviewed. REVIEW OF SYSTEMS    (2-9 systems for level 4, 10 or more for level 5)     Review of Systems   Constitutional: Negative. Negative for activity change, appetite change, chills, fatigue and fever. HENT: Negative for congestion, ear discharge, ear pain, hearing loss, rhinorrhea, sinus pressure and sore throat. Eyes: Negative for photophobia, pain and visual disturbance. Respiratory: Negative for apnea, cough, shortness of breath and wheezing. Cardiovascular: Negative for chest pain, palpitations and leg swelling. Gastrointestinal: Negative for abdominal distention, abdominal pain, constipation, diarrhea, nausea and vomiting. Endocrine: Negative for cold intolerance, heat intolerance and polyuria. Genitourinary: Negative for dysuria, flank pain, frequency and urgency. Musculoskeletal: Negative for arthralgias, back pain, gait problem, myalgias and neck stiffness. Skin: Negative for color change, pallor, rash and wound. Left thigh induration   Allergic/Immunologic: Negative for food allergies and immunocompromised state.    Neurological: Negative for dizziness, tremors, syncope, weakness, light-headedness and headaches. Psychiatric/Behavioral: Negative for agitation, confusion and hallucinations. All other systems reviewed and are negative. Except as noted above the remainder of the review of systems was reviewed and negative.        PAST MEDICAL HISTORY     Past Medical History:   Diagnosis Date    Chronic back pain     [facility] ([facility] [address]     Diabetes mellitus ([facility] [address]     Hypertension     Hypertension          SURGICAL HISTORY       Past Surgical History:   Procedure Laterality Date    HERNIA REPAIR      MOUTH SURGERY      TONSILLECTOMY AND ADENOIDECTOMY           CURRENT MEDICATIONS       Discharge Medication List as of 1/24/2021 11:57 AM      CONTINUE these medications which have NOT CHANGED    Details   cyclobenzaprine (FLEXERIL) 10 MG tablet Take 1 tablet by mouth 3 times daily as needed for Muscle spasms, Disp-21 tablet, R-0Print      meloxicam (MOBIC) 7.5 MG tablet Take 1 tablet by mouth daily for 14 days, Disp-14 tablet, R-0Print      insulin glargine (LANTUS) 100 UNIT/ML injection vial Inject into the skin nightlyHistorical Med      Insulin Aspart (NOVOLOG SC) Inject into the skinHistorical Med      melatonin (RA MELATONIN) 3 MG TABS tablet Take 1 tablet by mouth nightly as needed (insomnia), Disp-15 tablet, R-3Print      glipiZIDE (GLUCOTROL) 5 MG tablet Take 1 tablet by mouth 2 times daily (before meals), Disp-60 tablet, R-0Normal      metFORMIN (GLUCOPHAGE-XR) 500 MG extended release tablet Take 1 tablet by mouth 2 times daily, Disp-60 tablet, R-0Normal      metoprolol tartrate (LOPRESSOR) 50 MG tablet Take 1 tablet by mouth 2 times daily, Disp-60 tablet, R-0Normal      FREESTYLE LITE strip 1 each by Does not apply route 3 times daily, Disp-500 each, R-5, DAWNormal      Blood Glucose Monitoring Suppl (FREESTYLE LITE) NICHOLAS R-0, Historical Med      Blood Pressure Monitoring (BLOOD PRESSURE KIT) NICHOLAS Dispense 1 machine with large cuff, diagnosis hypertension and diabetes.     Patient is to take blood pressure 2x a day and record., Disp-1 Device, R-0Print             ALLERGIES     Buspar [buspirone], Risperidone, Clindamycin/lincomycin, Tramadol, and Ketorolac    FAMILY HISTORY       Family History   Problem Relation Age of Onset    Heart Disease Mother     High Blood Pressure Mother     Diabetes Mother     High Blood Pressure Father           SOCIAL HISTORY       Social History     Socioeconomic History    Marital status: Single     Spouse name: None    Number of children: None    Years of education: None    Highest education level: None   Occupational History    None   Social Needs    Financial resource strain: None    Food insecurity     Worry: None     Inability: None    Transportation needs     Medical: None     Non-medical: None   Tobacco Use    Smoking status: Current Every Day Smoker     Packs/day: 1.00     Types: Cigarettes    Smokeless tobacco: Never Used    Tobacco comment: trying to quit   Substance and Sexual Activity    Alcohol use: No    Drug use: No    Sexual activity: Yes     Partners: Female   Lifestyle    Physical activity     Days per week: None     Minutes per session: None    Stress: None   Relationships    Social connections     Talks on phone: None     Gets together: None     Attends Episcopal service: None     Active member of club or organization: None     Attends meetings of clubs or organizations: None     Relationship status: None    Intimate partner violence     Fear of current or ex partner: None     Emotionally abused: None     Physically abused: None     Forced sexual activity: None   Other Topics Concern    None   Social History Narrative    None       SCREENINGS             PHYSICAL EXAM    (up to 7 for level 4, 8 or more for level 5)     ED Triage Vitals [01/24/21 1137]   BP Temp Temp Source Pulse Resp SpO2 Height Weight   (!) 147/101 97.9 °F (36.6 °C) Oral 108 17 97 % 5' 10\" (1.778 m) 278 lb (126.1 Coloration: Skin is not ashen, cyanotic, jaundiced, mottled, pale or sallow. Findings: Abscess present. No abrasion, acne, bruising, burn, ecchymosis, erythema, signs of injury, laceration, lesion, petechiae, rash or wound. Rash is not crusting, macular, nodular, papular, purpuric, pustular, scaling, urticarial or vesicular. Nails: There is no clubbing. Neurological:      General: No focal deficit present. Mental Status: He is alert and oriented to person, place, and time. Mental status is at baseline. Cranial Nerves: No cranial nerve deficit. Sensory: No sensory deficit. Motor: No weakness or abnormal muscle tone. Coordination: Coordination normal.      Gait: Gait normal.      Deep Tendon Reflexes: Reflexes are normal and symmetric. Reflexes normal.   Psychiatric:         Mood and Affect: Mood normal.         Behavior: Behavior normal.         Thought Content: Thought content normal.         Judgment: Judgment normal.         DIAGNOSTIC RESULTS     EKG: All EKG's are interpreted by the Emergency Department Physician who either signs or Co-signs this chart in the absence of a cardiologist.        RADIOLOGY:   Non-plain film images such as CT, Ultrasound and MRI are read by the radiologist. Zohra Man radiographicimages are visualized and preliminarily interpreted by the emergency physician with the below findings:        Interpretation per the Radiologist below, if available at the time of this note:    No orders to display         ED BEDSIDE ULTRASOUND:   Performed by ED Physician - none    LABS:  Labs Reviewed - No data to display    All other labs were within normal range or not returned as of this dictation.     EMERGENCY DEPARTMENT COURSE and DIFFERENTIALDIAGNOSIS/MDM:   Vitals:    Vitals:    01/24/21 1137   BP: (!) 147/101   Pulse: 108   Resp: 17   Temp: 97.9 °F (36.6 °C)   TempSrc: Oral   SpO2: 97%   Weight: 278 lb (126.1 kg)   Height: 5' 10\" (1.778 m) MDM  Number of Diagnoses or Management Options  Risk of Complications, Morbidity, and/or Mortality  Presenting problems: moderate  Diagnostic procedures: low  Management options: moderate    Patient Progress  Patient progress: stable      CRITICAL CARE TIME   Total Critical Care time was  minutes, excluding separately reportable procedures. There was a high probability of clinically significant/life threatening deterioration in the patient's condition which required my urgentintervention. CONSULTS:  None    PROCEDURES:  Unless otherwise noted below, none     Procedures    FINAL IMPRESSION      1. Cellulitis and abscess of leg    2.  Anxiety state          DISPOSITION/PLAN   DISPOSITION Decision To Discharge 01/24/2021 11:45:00 AM      PATIENT REFERRED TO:  oTni Weston MD  [address] [facility] , [facility] [address] [place] [phone]    In 3 days      Quin Kirby MD  [address] [place] [address]  [phone]    In 1 day        DISCHARGE MEDICATIONS:  Discharge Medication List as of 1/24/2021 11:57 AM      START taking these medications    Details   sulfamethoxazole-trimethoprim (BACTRIM DS) 800-160 MG per tablet Take 1 tablet by mouth 2 times daily for 10 days, Disp-20 tablet, R-0Normal                (Please note that portions of this note were completed with a voice recognitionprogram.  Efforts were made to edit the dictations but occasionally words are mis-transcribed.)    Tate Jose MD (electronically signed)  Attending Emergency Physician          Tate Jose MD  01/24/21 [address] [facility] [address], [place]  01/24/21 5303

## 2021-01-31 ENCOUNTER — HOSPITAL ENCOUNTER (EMERGENCY)
Age: 36
Discharge: HOME | End: 2021-01-31
Attending: EMERGENCY MEDICINE
Payer: MEDICARE

## 2021-01-31 VITALS
TEMPERATURE: 98.5 F | OXYGEN SATURATION: 94 % | BODY MASS INDEX: 40.18 KG/M2 | HEART RATE: 99 BPM | DIASTOLIC BLOOD PRESSURE: 93 MMHG | SYSTOLIC BLOOD PRESSURE: 159 MMHG | WEIGHT: 280 LBS | RESPIRATION RATE: 18 BRPM

## 2021-01-31 DIAGNOSIS — S39.012D BACK STRAIN, SUBSEQUENT ENCOUNTER: Primary | ICD-10-CM

## 2021-01-31 PROCEDURE — 99284 EMERGENCY DEPT VISIT MOD MDM: CPT

## 2021-01-31 PROCEDURE — 6370000000 HC RX 637 (ALT 250 FOR IP): Performed by: EMERGENCY MEDICINE

## 2021-01-31 PROCEDURE — 96372 THER/PROPH/DIAG INJ SC/IM: CPT

## 2021-01-31 PROCEDURE — 6360000002 HC RX W HCPCS: Performed by: EMERGENCY MEDICINE

## 2021-01-31 RX ORDER — HYDROCODONE BITARTRATE AND ACETAMINOPHEN 5; 325 MG/1; MG/1
1 TABLET ORAL EVERY 6 HOURS PRN
Qty: 5 TABLET | Refills: 0 | Status: SHIPPED | OUTPATIENT
Start: 2021-01-31 | End: 2021-02-03

## 2021-01-31 RX ORDER — PREDNISONE 20 MG/1
60 TABLET ORAL DAILY
Qty: 30 TABLET | Refills: 0 | Status: SHIPPED | OUTPATIENT
Start: 2021-01-31 | End: 2021-02-10

## 2021-01-31 RX ORDER — ONDANSETRON 4 MG/1
4 TABLET, ORALLY DISINTEGRATING ORAL ONCE
Status: COMPLETED | OUTPATIENT
Start: 2021-01-31 | End: 2021-01-31

## 2021-01-31 RX ADMIN — ONDANSETRON 4 MG: 4 TABLET, ORALLY DISINTEGRATING ORAL at 16:09

## 2021-01-31 RX ADMIN — HYDROMORPHONE HYDROCHLORIDE 1 MG: 1 INJECTION, SOLUTION INTRAMUSCULAR; INTRAVENOUS; SUBCUTANEOUS at 16:09

## 2021-01-31 ASSESSMENT — ENCOUNTER SYMPTOMS
BOWEL INCONTINENCE: 0
VOMITING: 0
NAUSEA: 0
EYE REDNESS: 0
EYE DISCHARGE: 0
COUGH: 0
SHORTNESS OF BREATH: 0
BACK PAIN: 1
SORE THROAT: 0
ABDOMINAL PAIN: 0

## 2021-01-31 ASSESSMENT — PAIN SCALES - GENERAL: PAINLEVEL_OUTOF10: 8

## 2021-01-31 ASSESSMENT — PAIN DESCRIPTION - PAIN TYPE: TYPE: ACUTE PAIN

## 2021-01-31 ASSESSMENT — PAIN DESCRIPTION - DESCRIPTORS: DESCRIPTORS: ACHING

## 2021-01-31 NOTE — ED NOTES
Discharge instructions given. Patient verbalizes understanding and denies any questions. Scripts x 2 given. Patient ambulating with steady gait upon discharge.         Jesse Pino RN  01/31/21 [id number]

## 2021-01-31 NOTE — ED TRIAGE NOTES
Patient reports back pain and left groin pain. Patient states he had abscess drained by pcp on left groin a few days ago. A/0 x3, ambulatory, resps even and unlabored on room air.

## 2021-01-31 NOTE — ED PROVIDER NOTES
[address] [place] [address] ED  EMERGENCY DEPARTMENT ENCOUNTER      Pt Name: Reji Doran  MRN: [id number]  [place] 1985  Date of evaluation: 1/31/2021  Provider: Rayshawn Solorzano DO        HISTORY OF PRESENT ILLNESS    Reji Doran is a 43 y.o. male per chart review has ah/o back pain. He has had frequent visits for back pain. The history is provided by the patient. Back Pain  Location:  Lumbar spine  Quality:  Aching  Radiates to:  Does not radiate  Pain severity:  Moderate  Pain is:  Same all the time  Onset quality:  Gradual  Duration:  1 day  Timing:  Constant  Progression:  Unchanged  Chronicity:  Recurrent  Relieved by:  Nothing  Worsened by:  Nothing  Ineffective treatments:  None tried  Associated symptoms: no abdominal pain, no bladder incontinence, no bowel incontinence, no chest pain, no dysuria, no fever, no leg pain, no numbness, no paresthesias, no tingling and no weakness    Risk factors: no recent surgery and no steroid use             REVIEW OF SYSTEMS       Review of Systems   Constitutional: Negative for chills and fever. HENT: Negative for ear pain and sore throat. Eyes: Negative for discharge and redness. Respiratory: Negative for cough and shortness of breath. Cardiovascular: Negative for chest pain and palpitations. Gastrointestinal: Negative for abdominal pain, bowel incontinence, nausea and vomiting. Genitourinary: Negative for bladder incontinence, difficulty urinating and dysuria. Musculoskeletal: Positive for back pain. Negative for neck pain. Skin: Negative for rash and wound. Neurological: Negative for dizziness, tingling, syncope, weakness, numbness and paresthesias. Psychiatric/Behavioral: Negative for confusion. The patient is not nervous/anxious. All other systems reviewed and are negative. Except as noted above the remainder of the review of systems was reviewed and negative.        PAST MEDICAL HISTORY     Past Medical History:   Diagnosis Date    Chronic back pain     [facility] ([facility] [address]     Diabetes mellitus ([facility] [address]     Hypertension     Hypertension          SURGICAL HISTORY       Past Surgical History:   Procedure Laterality Date    HERNIA REPAIR      MOUTH SURGERY      TONSILLECTOMY AND ADENOIDECTOMY           CURRENT MEDICATIONS       Discharge Medication List as of 1/31/2021  5:30 PM      CONTINUE these medications which have NOT CHANGED    Details   sulfamethoxazole-trimethoprim (BACTRIM DS) 800-160 MG per tablet Take 1 tablet by mouth 2 times daily for 10 days, Disp-20 tablet, R-0Normal      cyclobenzaprine (FLEXERIL) 10 MG tablet Take 1 tablet by mouth 3 times daily as needed for Muscle spasms, Disp-21 tablet, R-0Print      meloxicam (MOBIC) 7.5 MG tablet Take 1 tablet by mouth daily for 14 days, Disp-14 tablet, R-0Print      insulin glargine (LANTUS) 100 UNIT/ML injection vial Inject into the skin nightlyHistorical Med      Insulin Aspart (NOVOLOG SC) Inject into the skinHistorical Med      melatonin (RA MELATONIN) 3 MG TABS tablet Take 1 tablet by mouth nightly as needed (insomnia), Disp-15 tablet, R-3Print      glipiZIDE (GLUCOTROL) 5 MG tablet Take 1 tablet by mouth 2 times daily (before meals), Disp-60 tablet, R-0Normal      metFORMIN (GLUCOPHAGE-XR) 500 MG extended release tablet Take 1 tablet by mouth 2 times daily, Disp-60 tablet, R-0Normal      metoprolol tartrate (LOPRESSOR) 50 MG tablet Take 1 tablet by mouth 2 times daily, Disp-60 tablet, R-0Normal      FREESTYLE LITE strip 1 each by Does not apply route 3 times daily, Disp-500 each, R-5, DAWNormal      Blood Glucose Monitoring Suppl (FREESTYLE LITE) NICHOLAS R-0, Historical Med      Blood Pressure Monitoring (BLOOD PRESSURE KIT) NICHOLAS Dispense 1 machine with large cuff, diagnosis hypertension and diabetes.     Patient is to take blood pressure 2x a day and record., Disp-1 Device, R-0Print             ALLERGIES     Buspar [buspirone], Risperidone, Clindamycin/lincomycin, Tramadol, and Ketorolac    FAMILY HISTORY       Family History   Problem Relation Age of Onset    Heart Disease Mother     High Blood Pressure Mother     Diabetes Mother     High Blood Pressure Father           SOCIAL HISTORY       Social History     Socioeconomic History    Marital status: Single     Spouse name: None    Number of children: None    Years of education: None    Highest education level: None   Occupational History    None   Social Needs    Financial resource strain: None    Food insecurity     Worry: None     Inability: None    Transportation needs     Medical: None     Non-medical: None   Tobacco Use    Smoking status: Current Every Day Smoker     Packs/day: 1.00     Types: Cigarettes    Smokeless tobacco: Never Used    Tobacco comment: trying to quit   Substance and Sexual Activity    Alcohol use: No    Drug use: No    Sexual activity: Yes     Partners: Female   Lifestyle    Physical activity     Days per week: None     Minutes per session: None    Stress: None   Relationships    Social connections     Talks on phone: None     Gets together: None     Attends Synagogue service: None     Active member of club or organization: None     Attends meetings of clubs or organizations: None     Relationship status: None    Intimate partner violence     Fear of current or ex partner: None     Emotionally abused: None     Physically abused: None     Forced sexual activity: None   Other Topics Concern    None   Social History Narrative    None         PHYSICAL EXAM       ED Triage Vitals [01/31/21 1555]   BP Temp Temp Source Pulse Resp SpO2 Height Weight   (!) 159/93 98.4 °F (36.9 °C) Oral 105 17 95 % -- 280 lb (127 kg)       Physical Exam  Vitals signs and nursing note reviewed. Constitutional:       Appearance: Normal appearance. HENT:      Head: Normocephalic and atraumatic.       Right Ear: Tympanic membrane normal.      Left Ear: Tympanic membrane normal.      Nose: Nose normal. Mouth/Throat:      Mouth: Mucous membranes are moist.      Pharynx: Oropharynx is clear. Eyes:      General: Lids are normal.      Extraocular Movements: Extraocular movements intact. Conjunctiva/sclera: Conjunctivae normal.      Pupils: Pupils are equal, round, and reactive to light. Neck:      Musculoskeletal: Full passive range of motion without pain, normal range of motion and neck supple. Cardiovascular:      Rate and Rhythm: Normal rate and regular rhythm. Pulses: Normal pulses. Heart sounds: Normal heart sounds. Pulmonary:      Effort: Pulmonary effort is normal.      Breath sounds: Normal breath sounds. Abdominal:      General: Abdomen is flat. Bowel sounds are normal.      Palpations: Abdomen is soft. Musculoskeletal:      Lumbar back: He exhibits decreased range of motion and spasm. Skin:     General: Skin is warm. Capillary Refill: Capillary refill takes less than 2 seconds. Neurological:      General: No focal deficit present. Mental Status: He is alert and oriented to person, place, and time. Deep Tendon Reflexes: Reflexes are normal and symmetric. Psychiatric:         Attention and Perception: Attention and perception normal.         Mood and Affect: Mood normal.         Behavior: Behavior normal. Behavior is cooperative. LABS:  Labs Reviewed - No data to display      MDM:   Vitals:    Vitals:    01/31/21 1555 01/31/21 1730   BP: (!) 159/93    Pulse: 105 99   Resp: 17 18   Temp: 98.4 °F (36.9 °C) 98.5 °F (36.9 °C)   TempSrc: Oral Oral   SpO2: 95% 94%   Weight: 280 lb (127 kg)        MDM  Number of Diagnoses or Management Options  Back strain, subsequent encounter  Diagnosis management comments: Patient presents with back pain that is an exacerbation of his chronic back pain. The patient was given Dilaudid 1mg IM and Zofran 4mg ODT for his pain and nausea. He will be discharged home.   I gave him Rx for hydrocodone 325/5 1 po q4-6 hours as needed for pain. #5 tabs  Predisone 60mg PO 1 daily for 5 days. He will follow up with his doctor in 2 days. BRIGETTE LYNNY     The patient will follow up in 2 days with their primary care doctor. If their symptoms change or get worse they will return to the ER. CRITICAL CARE TIME   Total CriticalCare time was 0 minutes, excluding separately reportable procedures. There was a high probability of clinically significant/life threatening deterioration in the patient's condition which required my urgent intervention. PROCEDURES:  Unlessotherwise noted below, none     Procedures      FINAL IMPRESSION      1.  Back strain, subsequent encounter          DISPOSITION/PLAN   DISPOSITION Decision To Discharge 01/31/2021 05:26:33 PM          Ashwin Walker DO (electronically signed)  Attending Emergency Physician          Darrick Quinn DO  01/31/21 9928

## 2021-02-05 ENCOUNTER — HOSPITAL ENCOUNTER (EMERGENCY)
Age: 36
Discharge: HOME | End: 2021-02-05
Attending: EMERGENCY MEDICINE
Payer: MEDICARE

## 2021-02-05 VITALS
TEMPERATURE: 98 F | OXYGEN SATURATION: 98 % | RESPIRATION RATE: 18 BRPM | DIASTOLIC BLOOD PRESSURE: 96 MMHG | SYSTOLIC BLOOD PRESSURE: 150 MMHG | WEIGHT: 280 LBS | HEIGHT: 70 IN | BODY MASS INDEX: 40.09 KG/M2 | HEART RATE: 100 BPM

## 2021-02-05 DIAGNOSIS — G89.29 CHRONIC BILATERAL LOW BACK PAIN WITHOUT SCIATICA: Primary | ICD-10-CM

## 2021-02-05 DIAGNOSIS — M54.50 CHRONIC BILATERAL LOW BACK PAIN WITHOUT SCIATICA: Primary | ICD-10-CM

## 2021-02-05 PROCEDURE — 6370000000 HC RX 637 (ALT 250 FOR IP): Performed by: EMERGENCY MEDICINE

## 2021-02-05 PROCEDURE — 99283 EMERGENCY DEPT VISIT LOW MDM: CPT

## 2021-02-05 RX ORDER — SULFAMETHOXAZOLE AND TRIMETHOPRIM 800; 160 MG/1; MG/1
1 TABLET ORAL 2 TIMES DAILY
Qty: 20 TABLET | Refills: 0 | Status: SHIPPED | OUTPATIENT
Start: 2021-02-05 | End: 2021-02-15

## 2021-02-05 RX ORDER — OXYCODONE HYDROCHLORIDE AND ACETAMINOPHEN 5; 325 MG/1; MG/1
1 TABLET ORAL EVERY 6 HOURS PRN
Qty: 5 TABLET | Refills: 0 | Status: SHIPPED | OUTPATIENT
Start: 2021-02-05 | End: 2021-02-08

## 2021-02-05 RX ORDER — OXYCODONE HYDROCHLORIDE AND ACETAMINOPHEN 5; 325 MG/1; MG/1
1 TABLET ORAL ONCE
Status: COMPLETED | OUTPATIENT
Start: 2021-02-05 | End: 2021-02-05

## 2021-02-05 RX ADMIN — OXYCODONE HYDROCHLORIDE AND ACETAMINOPHEN 1 TABLET: 5; 325 TABLET ORAL at 22:06

## 2021-02-05 ASSESSMENT — PAIN DESCRIPTION - PROGRESSION
CLINICAL_PROGRESSION: GRADUALLY IMPROVING
CLINICAL_PROGRESSION: NOT CHANGED

## 2021-02-05 ASSESSMENT — PAIN DESCRIPTION - ONSET: ONSET: ON-GOING

## 2021-02-05 ASSESSMENT — PAIN DESCRIPTION - PAIN TYPE: TYPE: CHRONIC PAIN

## 2021-02-05 ASSESSMENT — PAIN SCALES - GENERAL: PAINLEVEL_OUTOF10: 8

## 2021-02-05 ASSESSMENT — PAIN DESCRIPTION - ORIENTATION: ORIENTATION: LOWER

## 2021-02-05 ASSESSMENT — PAIN DESCRIPTION - LOCATION: LOCATION: BACK;KNEE

## 2021-02-05 ASSESSMENT — PAIN DESCRIPTION - FREQUENCY: FREQUENCY: CONTINUOUS
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
## 2021-02-11 ENCOUNTER — HOSPITAL ENCOUNTER (EMERGENCY)
Age: 36
Discharge: HOME | End: 2021-02-11
Attending: EMERGENCY MEDICINE
Payer: MEDICARE

## 2021-02-11 VITALS
RESPIRATION RATE: 16 BRPM | WEIGHT: 280 LBS | BODY MASS INDEX: 40.09 KG/M2 | OXYGEN SATURATION: 97 % | DIASTOLIC BLOOD PRESSURE: 105 MMHG | HEART RATE: 125 BPM | HEIGHT: 70 IN | TEMPERATURE: 98.3 F | SYSTOLIC BLOOD PRESSURE: 155 MMHG

## 2021-02-11 DIAGNOSIS — M54.50 CHRONIC LOW BACK PAIN WITHOUT SCIATICA, UNSPECIFIED BACK PAIN LATERALITY: Primary | ICD-10-CM

## 2021-02-11 DIAGNOSIS — G89.29 CHRONIC LOW BACK PAIN WITHOUT SCIATICA, UNSPECIFIED BACK PAIN LATERALITY: Primary | ICD-10-CM

## 2021-02-11 PROCEDURE — 99282 EMERGENCY DEPT VISIT SF MDM: CPT

## 2021-02-11 RX ORDER — CYCLOBENZAPRINE HCL 10 MG
10 TABLET ORAL 3 TIMES DAILY PRN
Qty: 20 TABLET | Refills: 0 | Status: SHIPPED | OUTPATIENT
Start: 2021-02-11 | End: 2021-02-21

## 2021-02-11 RX ORDER — IBUPROFEN 600 MG/1
600 TABLET ORAL 4 TIMES DAILY PRN
Qty: 15 TABLET | Refills: 0 | Status: SHIPPED | OUTPATIENT
Start: 2021-02-11

## 2021-02-11 ASSESSMENT — PAIN SCALES - GENERAL: PAINLEVEL_OUTOF10: 9

## 2021-02-11 ASSESSMENT — PAIN DESCRIPTION - PAIN TYPE: TYPE: ACUTE PAIN

## 2021-02-12 NOTE — ED PROVIDER NOTES
CC/HPI: 53-year-old male to the emergency department with chronic back pain. Patient very vague historian. Has been seen in the emergency department numerous times. I saw the patient 6 days ago on last Friday. At that time patient states that he had an appointment with Dr. Cameron Monk, general surgery, on Tuesday the 9th concerning follow-up for an abscess in his groin area that he is still having pain for. Patient was prescribed Bactrim. Patient now states that he has an appointment tomorrow with Dr. Mira Rivas concerning that complaint. Patient also stated at his last appointment that he had appointment with Dr. Luiza Mills on the 9th as well. When asked about that appointment patient did not recall however he states that he was called and told that he needs back surgery next week. He also states that he was not given any pain medication but was told that he would be able to get the medication at the time of the surgery. Patient does not know the date of the surgery or who his surgeon is stating that his mom has the information and he does not have a cell phone to talk with his mom. No office visit or surgical notes were found in the EMR. No new or unusual symptoms. No new injury. No fever no chills no nausea no vomiting no abdominal pain no loss of bowel or bladder complaints    VITALS/PMH/PSH: Reviewed per nurses notes    REVIEW OF SYSTEMS: As in chief complaint history of present illness, otherwise all other systems are reviewed and negative the total 10 systems reviewed    GEN: Pt alert and oriented, no acute distress patient appears uncomfortable with movement. HEENT:         Normocephalic/Atramatic        PERRL, EOMI  NECK: Nontender, no signs of trauma, no lymphadenopathy  HEART: Reg S1/S2, without murmer, rub or gallop  LUNGS: Clear to auscultation bilaterally, respirations even and unlabored  ABDOMEN: Bowel sounds positive times 4, soft, nontender, nondistended.   No guarding rebound or rigidity  MUSCULOSKELETAL/EXTREMITITES:  No signs of trauma, cyanosis or edema. No redness warmth or signs of infection no CVA tenderness to palpation. Patient diffusely tender mid lower lumbar spine both midline and paraspinal musculature. No obvious spasm. Patient neurovascularly intact bilateral lower extremities. LYMPH: no peripheral lympadenopathy noted  SKIN:  Warm & dry, no rash  NEUROLOGIC:  Alert and oriented. Speech clear    Medical decision making/ED course;  Patient main stable throughout the course of his emergency department stay. Discussed that at this time I did not feel comfortable giving the patient narcotic pain medication will write for muscle relaxer and he is to follow-up with his surgeons and keep his upcoming appointment  Clinical impression;  1) chronic back pain  2) drug-seeking behavior    Disposition/plan;  Patient discharged home in stable condition given discharge instructions on back pain. Patient is to follow-up and keep his upcoming appointments. Return for worsening or changes to symptoms.      Jese Lua DO  02/11/21 [id number]

## 2021-02-24 ENCOUNTER — APPOINTMENT (OUTPATIENT)
Dept: GENERAL RADIOLOGY | Age: 36
End: 2021-02-24
Payer: MEDICARE

## 2021-02-24 ENCOUNTER — HOSPITAL ENCOUNTER (EMERGENCY)
Age: 36
Discharge: HOME | End: 2021-02-25
Attending: EMERGENCY MEDICINE
Payer: MEDICARE

## 2021-02-24 DIAGNOSIS — F41.1 ANXIETY STATE: ICD-10-CM

## 2021-02-24 DIAGNOSIS — S80.01XA CONTUSION OF RIGHT KNEE AND LOWER LEG, INITIAL ENCOUNTER: ICD-10-CM

## 2021-02-24 DIAGNOSIS — S20.212A CONTUSION OF LEFT CHEST WALL, INITIAL ENCOUNTER: Primary | ICD-10-CM

## 2021-02-24 DIAGNOSIS — S80.11XA CONTUSION OF RIGHT KNEE AND LOWER LEG, INITIAL ENCOUNTER: ICD-10-CM

## 2021-02-24 PROCEDURE — 99283 EMERGENCY DEPT VISIT LOW MDM: CPT

## 2021-02-24 PROCEDURE — 96372 THER/PROPH/DIAG INJ SC/IM: CPT

## 2021-02-24 PROCEDURE — 71101 X-RAY EXAM UNILAT RIBS/CHEST: CPT

## 2021-02-24 PROCEDURE — 73564 X-RAY EXAM KNEE 4 OR MORE: CPT

## 2021-02-24 PROCEDURE — 6360000002 HC RX W HCPCS: Performed by: EMERGENCY MEDICINE

## 2021-02-24 RX ORDER — ORPHENADRINE CITRATE 30 MG/ML
60 INJECTION INTRAMUSCULAR; INTRAVENOUS ONCE
Status: COMPLETED | OUTPATIENT
Start: 2021-02-24 | End: 2021-02-24

## 2021-02-24 RX ADMIN — ORPHENADRINE CITRATE 60 MG: 30 INJECTION INTRAMUSCULAR; INTRAVENOUS at 23:29

## 2021-02-24 ASSESSMENT — PAIN DESCRIPTION - DESCRIPTORS: DESCRIPTORS: ACHING;SHARP;STABBING

## 2021-02-24 ASSESSMENT — PAIN DESCRIPTION - ORIENTATION: ORIENTATION: LEFT;LOWER

## 2021-02-24 ASSESSMENT — PAIN DESCRIPTION - LOCATION: LOCATION: RIB CAGE

## 2021-02-25 VITALS
HEART RATE: 91 BPM | RESPIRATION RATE: 16 BRPM | BODY MASS INDEX: 40.09 KG/M2 | OXYGEN SATURATION: 98 % | TEMPERATURE: 98.4 F | DIASTOLIC BLOOD PRESSURE: 78 MMHG | HEIGHT: 70 IN | SYSTOLIC BLOOD PRESSURE: 122 MMHG | WEIGHT: 280 LBS

## 2021-02-25 RX ORDER — ORPHENADRINE CITRATE 100 MG/1
100 TABLET, EXTENDED RELEASE ORAL 2 TIMES DAILY
Qty: 20 TABLET | Refills: 0 | Status: SHIPPED | OUTPATIENT
Start: 2021-02-25 | End: 2021-03-07

## 2021-02-25 ASSESSMENT — PAIN DESCRIPTION - PROGRESSION: CLINICAL_PROGRESSION: GRADUALLY IMPROVING

## 2021-02-25 ASSESSMENT — ENCOUNTER SYMPTOMS
WHEEZING: 0
NAUSEA: 0
ABDOMINAL DISTENTION: 0
ABDOMINAL PAIN: 0
APNEA: 0
SINUS PRESSURE: 0
SORE THROAT: 0
DIARRHEA: 0
SHORTNESS OF BREATH: 0
CONSTIPATION: 0
COLOR CHANGE: 0
RHINORRHEA: 0
VOMITING: 0
EYE PAIN: 0
COUGH: 0
BACK PAIN: 0
PHOTOPHOBIA: 0

## 2021-02-25 ASSESSMENT — PAIN DESCRIPTION - FREQUENCY: FREQUENCY: CONTINUOUS

## 2021-02-25 ASSESSMENT — PAIN DESCRIPTION - DESCRIPTORS: DESCRIPTORS: SHARP

## 2021-02-25 NOTE — ED PROVIDER NOTES
[address] [place] [address] ED  eMERGENCY dEPARTMENT eNCOUnter      Pt Name: Juan José Evans  MRN: [id number]  [place] 1985  Date of evaluation: 2/24/2021  Provider: Yoselyn Yeh MD    CHIEF COMPLAINT       Chief Complaint   Patient presents with    Rib Pain     left rib pain fell off of a scooter earlier          HISTORY OF PRESENT ILLNESS   (Location/Symptom, Timing/Onset,Context/Setting, Quality, Duration, Modifying Factors, Severity)  Note limiting factors. Juan José Evans is a 43 y.o. male who presents to the emergency department with complaint of left rib cage pain and right knee pain status post fall off scooter just prior to presentation. Denies head injury with fall. Pain is 8 in a scale of 1-10 and worse with deep breath and ambulation. Pain is sharp and does not radiate. Denies loss of consciousness. He was able to get up and walk away. He ambulated to the ED without problem. HPI    Nursing Notes were reviewed. REVIEW OF SYSTEMS    (2-9 systems for level 4, 10 or more for level 5)     Review of Systems   Constitutional: Negative. Negative for activity change, appetite change, chills, fatigue and fever. HENT: Negative for congestion, ear discharge, ear pain, hearing loss, rhinorrhea, sinus pressure and sore throat. Eyes: Negative for photophobia, pain and visual disturbance. Respiratory: Negative for apnea, cough, shortness of breath and wheezing. Cardiovascular: Negative for chest pain, palpitations and leg swelling. Gastrointestinal: Negative for abdominal distention, abdominal pain, constipation, diarrhea, nausea and vomiting. Endocrine: Negative for cold intolerance, heat intolerance and polyuria. Genitourinary: Negative for dysuria, flank pain, frequency and urgency. Musculoskeletal: Positive for arthralgias and myalgias. Negative for back pain, gait problem and neck stiffness. Skin: Negative for color change, pallor and rash.    Allergic/Immunologic: Negative for food allergies and immunocompromised state. Neurological: Negative for dizziness, tremors, syncope, weakness, light-headedness and headaches. Psychiatric/Behavioral: Negative for agitation, confusion and hallucinations. All other systems reviewed and are negative. Except as noted above the remainder of the review of systems was reviewed and negative. PAST MEDICAL HISTORY     Past Medical History:   Diagnosis Date    Chronic back pain     Diabetes mellitus ([facility] [address] [facility]     Diabetes mellitus ([facility] [facility]     Hypertension     Hypertension          SURGICAL HISTORY       Past Surgical History:   Procedure Laterality Date    HERNIA REPAIR      MOUTH SURGERY      TONSILLECTOMY AND ADENOIDECTOMY           CURRENT MEDICATIONS       Previous Medications    BLOOD GLUCOSE MONITORING SUPPL (FREESTYLE LITE) NICHOLAS    U UTD TO TEST 5-6 TIMES D    BLOOD PRESSURE MONITORING (BLOOD PRESSURE KIT) NICHOLAS    Dispense 1 machine with large cuff, diagnosis hypertension and diabetes. Patient is to take blood pressure 2x a day and record.     FREESTYLE LITE STRIP    1 each by Does not apply route 3 times daily    GLIPIZIDE (GLUCOTROL) 5 MG TABLET    Take 1 tablet by mouth 2 times daily (before meals)    IBUPROFEN (ADVIL;MOTRIN) 600 MG TABLET    Take 1 tablet by mouth 4 times daily as needed for Pain    INSULIN ASPART (NOVOLOG SC)    Inject into the skin    INSULIN GLARGINE (LANTUS) 100 UNIT/ML INJECTION VIAL    Inject into the skin nightly    MELATONIN (RA MELATONIN) 3 MG TABS TABLET    Take 1 tablet by mouth nightly as needed (insomnia)    MELOXICAM (MOBIC) 7.5 MG TABLET    Take 1 tablet by mouth daily for 14 days    METFORMIN (GLUCOPHAGE-XR) 500 MG EXTENDED RELEASE TABLET    Take 1 tablet by mouth 2 times daily    METOPROLOL TARTRATE (LOPRESSOR) 50 MG TABLET    Take 1 tablet by mouth 2 times daily       ALLERGIES     Buspar [buspirone], Risperidone, Clindamycin/lincomycin, Tramadol, and Ketorolac    FAMILY HISTORY       Family History Problem Relation Age of Onset    Heart Disease Mother     High Blood Pressure Mother     Diabetes Mother     High Blood Pressure Father           SOCIAL HISTORY       Social History     Socioeconomic History    Marital status: Single     Spouse name: None    Number of children: None    Years of education: None    Highest education level: None   Occupational History    None   Social Needs    Financial resource strain: None    Food insecurity     Worry: None     Inability: None    Transportation needs     Medical: None     Non-medical: None   Tobacco Use    Smoking status: Current Every Day Smoker     Packs/day: 1.00     Types: Cigarettes    Smokeless tobacco: Never Used    Tobacco comment: trying to quit   Substance and Sexual Activity    Alcohol use: No    Drug use: No    Sexual activity: Yes     Partners: Female   Lifestyle    Physical activity     Days per week: None     Minutes per session: None    Stress: None   Relationships    Social connections     Talks on phone: None     Gets together: None     Attends Protestant service: None     Active member of club or organization: None     Attends meetings of clubs or organizations: None     Relationship status: None    Intimate partner violence     Fear of current or ex partner: None     Emotionally abused: None     Physically abused: None     Forced sexual activity: None   Other Topics Concern    None   Social History Narrative    None       SCREENINGS             PHYSICAL EXAM    (up to 7 for level 4, 8 or more for level 5)     ED Triage Vitals [02/24/21 2319]   BP Temp Temp Source Pulse Resp SpO2 Height Weight   126/80 98.5 °F (36.9 °C) Oral 92 18 93 % 5' 10\" (1.778 m) 280 lb (127 kg)       Physical Exam  Vitals signs and nursing note reviewed. Constitutional:       General: He is not in acute distress. Appearance: Normal appearance. He is well-developed. He is obese. He is not ill-appearing, toxic-appearing or diaphoretic.    HENT: Head: Normocephalic and atraumatic. Nose: Nose normal. No congestion or rhinorrhea. Mouth/Throat:      Mouth: Mucous membranes are moist.      Pharynx: Oropharynx is clear. No oropharyngeal exudate or posterior oropharyngeal erythema. Eyes:      General: No scleral icterus. Right eye: No discharge. Left eye: No discharge. Extraocular Movements: Extraocular movements intact. Conjunctiva/sclera: Conjunctivae normal.      Pupils: Pupils are equal, round, and reactive to light. Neck:      Musculoskeletal: Normal range of motion and neck supple. No neck rigidity or muscular tenderness. Thyroid: No thyromegaly. Vascular: No carotid bruit or JVD. Trachea: No tracheal deviation. Cardiovascular:      Rate and Rhythm: Normal rate and regular rhythm. Heart sounds: Normal heart sounds. No murmur. No friction rub. No gallop. Pulmonary:      Effort: Pulmonary effort is normal. No respiratory distress. Breath sounds: Normal breath sounds. No stridor. No wheezing, rhonchi or rales. Chest:      Chest wall: No tenderness. Abdominal:      General: Bowel sounds are normal. There is no distension. Palpations: Abdomen is soft. There is no mass. Tenderness: There is no abdominal tenderness. There is no right CVA tenderness, left CVA tenderness, guarding or rebound. Hernia: No hernia is present. Musculoskeletal: Normal range of motion. General: Signs of injury present. No swelling, tenderness or deformity. Right lower leg: No edema. Left lower leg: No edema. Lymphadenopathy:      Cervical: No cervical adenopathy. Skin:     General: Skin is warm and dry. Capillary Refill: Capillary refill takes less than 2 seconds. Coloration: Skin is not jaundiced or pale. Findings: No bruising, erythema, lesion or rash. Neurological:      General: No focal deficit present.       Mental Status: He is alert and oriented to person, place, and time. Mental status is at baseline. Cranial Nerves: No cranial nerve deficit. Sensory: No sensory deficit. Motor: No weakness or abnormal muscle tone. Coordination: Coordination normal.      Gait: Gait normal.      Deep Tendon Reflexes: Reflexes are normal and symmetric. Reflexes normal.   Psychiatric:         Mood and Affect: Mood normal.         Behavior: Behavior normal.         Thought Content: Thought content normal.         Judgment: Judgment normal.         DIAGNOSTIC RESULTS     EKG: All EKG's are interpreted by the Emergency Department Physician who either signs or Co-signs this chart in the absence of a cardiologist.        RADIOLOGY:   Non-plain film images such as CT, Ultrasound and MRI are read by the radiologist. Caitlin Raman radiographicimages are visualized and preliminarily interpreted by the emergency physician with the below findings:    X-ray of the right knee shows no acute fractures or dislocations. X-ray of left ribs shows no fractures, no pneumothorax, no hemothorax. Interpretation per the Radiologist below, if available at the time of this note:    XR RIBS LEFT INCLUDE CHEST (MIN 3 VIEWS)    (Results Pending)   XR KNEE RIGHT (MIN 4 VIEWS)    (Results Pending)         ED BEDSIDE ULTRASOUND:   Performed by ED Physician - none    LABS:  Labs Reviewed - No data to display    All other labs were within normal range or not returned as of this dictation.     EMERGENCY DEPARTMENT COURSE and DIFFERENTIALDIAGNOSIS/MDM:   Vitals:    Vitals:    02/24/21 2319   BP: 126/80   Pulse: 92   Resp: 18   Temp: 98.5 °F (36.9 °C)   TempSrc: Oral   SpO2: 93%   Weight: 280 lb (127 kg)   Height: 5' 10\" (1.778 m)           MDM  Number of Diagnoses or Management Options     Amount and/or Complexity of Data Reviewed  Tests in the radiology section of CPT®: reviewed and ordered    Risk of Complications, Morbidity, and/or Mortality  Presenting problems: moderate  Diagnostic procedures: moderate  Management options: moderate    Patient Progress  Patient progress: improved      CRITICAL CARE TIME   Total Critical Care time was  minutes, excluding separately reportable procedures. There was a high probability of clinically significant/life threatening deterioration in the patient's condition which required my urgentintervention. CONSULTS:  None    PROCEDURES:  Unless otherwise noted below, none     Procedures    FINAL IMPRESSION      1. Contusion of left chest wall, initial encounter    2. Contusion of right knee and lower leg, initial encounter    3.  Anxiety state          DISPOSITION/PLAN   DISPOSITION        PATIENT REFERRED TO:  Jerry Garcia MD  [address] [place] [address] [phone]    In 3 days        DISCHARGE MEDICATIONS:  New Prescriptions    ORPHENADRINE (NORFLEX) 100 MG EXTENDED RELEASE TABLET    Take 1 tablet by mouth 2 times daily for 10 days          (Please note that portions of this note were completed with a voice recognitionprogram.  Efforts were made to edit the dictations but occasionally words are mis-transcribed.)    Angel Tolentino MD (electronically signed)  Attending Emergency Physician          Angel Tolentino MD  02/25/21 0013       Angel Tolentino MD  02/25/21 1246